# Patient Record
Sex: MALE | Race: WHITE | HISPANIC OR LATINO
[De-identification: names, ages, dates, MRNs, and addresses within clinical notes are randomized per-mention and may not be internally consistent; named-entity substitution may affect disease eponyms.]

---
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
## 2020-02-04 ENCOUNTER — EMERGENCY (EMERGENCY)
Age: 20
LOS: 1 days | Discharge: HOME | End: 2020-02-04
Attending: EMERGENCY MEDICINE | Admitting: EMERGENCY MEDICINE
Payer: MEDICAID

## 2020-02-04 VITALS
DIASTOLIC BLOOD PRESSURE: 80 MMHG | RESPIRATION RATE: 20 BRPM | TEMPERATURE: 99 F | WEIGHT: 164.46 LBS | HEART RATE: 103 BPM | OXYGEN SATURATION: 97 % | SYSTOLIC BLOOD PRESSURE: 128 MMHG

## 2020-02-04 VITALS — HEART RATE: 97 BPM

## 2020-02-04 PROCEDURE — 99284 EMERGENCY DEPT VISIT MOD MDM: CPT

## 2020-02-04 PROCEDURE — 71046 X-RAY EXAM CHEST 2 VIEWS: CPT | Mod: 26

## 2020-02-04 PROCEDURE — 93010 ELECTROCARDIOGRAM REPORT: CPT

## 2020-02-04 NOTE — ED PROVIDER NOTE - PATIENT PORTAL LINK FT
You can access the FollowMyHealth Patient Portal offered by [facility] by registering at the following website: [email or web address]. By joining [employer]’s FollowMyHealth portal, you will also be able to view your health information using other applications (apps) compatible with our system.

## 2020-02-04 NOTE — ED STATDOCS - OBJECTIVE STATEMENT
20 y/o M presents to ED c/o middle CP, and cough. Pt had a fever, and was vomiting yesterday. Pt denies taking any medication for CP.  Afebrile currently.  Pt denies any drugs, and smoking. Pt is sexually active, and uses protection.       PMH/PSH: negative  FH/SH: non-contributory, except as noted in the HPI  Allergies: No known drug allergies  Immunizations: Up-to-date  Medications: No chronic home medications 20 y/o M presents to ED c/o middle CP, and cough. Pt had a fever, and was vomiting yesterday. Pt denies taking any medication for CP.  Afebrile currently.  Pt denies any drugs, and smoking. Pt is sexually active, and uses protection.   PMH/PSH: negative  FH/SH: non-contributory, except as noted in the HPI  Allergies: No known drug allergies  Immunizations: Up-to-date  Medications: No chronic home medications  I performed a medical screening examination and determined this patient to be medically stable and will transfer to the [facility] adult ED for further care. heart and lung exam done and both did not reveal concerns for immediate intercvention.

## 2020-02-04 NOTE — ED PROVIDER NOTE - NSFOLLOWUPINSTRUCTIONS_ED_ALL_ED_FT
You were seen in the Emergency Department (ED) for chest pain. Your EKG and Chest xray were negative for acute life threatening injuries.      Please take over the counter Ibuprofen for your pain.    Please follow up with your primary care doctor in the next 72 hours.    Please return to the ED if you experience any new or concerning symptoms, such as: change in the characteristic of your chest pain, difficulty breathing, passing out, unable to eat or drink, unable to move or feel part of your body, fever, chills.     Thank you for visiting a [facility] ED.

## 2020-02-04 NOTE — ED PROVIDER NOTE - NS ED ROS FT
GENERAL: No fever, chills  EYES: no vision changes, no discharge.   HEENT: no difficulty swallowing or speaking   CARDIAC: + chest pain, no SOB, lower ex edema  PULMONARY: no cough, SOB  GI: no abdominal pain, n/v/d  : no dysuria  SKIN: no rashes  NEURO: no headache, lightheadedness, paresthesia  MSK: No joint pain, myalgia, weakness.

## 2020-02-04 NOTE — ED PROVIDER NOTE - CLINICAL SUMMARY MEDICAL DECISION MAKING FREE TEXT BOX
Young male no pmhx, pw shifting chest pressure morning after heavy vomiting. No sig findings on PE. Possible pneumomediastinum given history of vomiting and shifting chest pain. Less likely pericarditis or PE given normal EKG. CXR home.

## 2020-02-04 NOTE — ED PEDIATRIC TRIAGE NOTE - CHIEF COMPLAINT QUOTE
pt states "My lungs hurt when I breathe, had a headache and vomiting yesterday" pt alert, BCR, no PMH, IUTD, b/l lungs clear

## 2020-02-04 NOTE — ED PROVIDER NOTE - ATTENDING CONTRIBUTION TO CARE
Jessica: 20 yo male with no significant medical history c/o upper back/chest pain which began this morning after an episode fo forceful vomiting last night. Pt drank a double shot of espresso which he thinks upset his stomach and caused him to vomit. NO associated abdominal pain and since the vomiting pt has been tolerating PO. NO fevers, chills or flank pain. Chest pain worse when laying flat. No LE pain or edema. No recent travel or immobilization. Exam: GENERAL: well appearing, NAD, HEENT: MMM, no scleral icterus, CARDIO: +S1/S2, no murmurs, rubs or gallops, LUNGS: CTA B/L, no wheezing, rales or rhonchi, ABD: soft, nontender, BSx4 quadrants, no guarding or rigidity. EXT: No LE edema or calf TTP, 2+ distal pulses x 4 extremities. NEURO: AxOx3, SKIN: no rashes or lesions, well perfused A/P- 20 yo male with chest pain s/p vomiting. EKG unremarkable. will obtain CXR and repeat vitals.

## 2020-02-04 NOTE — ED ADULT TRIAGE NOTE - CHIEF COMPLAINT QUOTE
pt coming with SORTO since yest. + vomiting x 1, tightness of the chest, denies any ABd pain, no fever.

## 2020-02-04 NOTE — ED PROVIDER NOTE - OBJECTIVE STATEMENT
20 yo M no sig pmhx, pw chest pain. Pt endorse 10 minute episode of vomiting and retching last night. This AM, pt noticed shifting chest heaviness that is positional. Pt denies SOB, difficulty breathing, recent URI, fever and chills, sick contacts. EKG at triage normal sinus with no alternans. No cocaine use. No other complaints. no recent travel or immobilization.

## 2021-03-14 ENCOUNTER — EMERGENCY (EMERGENCY)
Facility: HOSPITAL | Age: 21
LOS: 1 days | Discharge: HOME | End: 2021-03-14
Attending: EMERGENCY MEDICINE | Admitting: EMERGENCY MEDICINE
Payer: MEDICAID

## 2021-03-14 VITALS
RESPIRATION RATE: 17 BRPM | OXYGEN SATURATION: 97 % | DIASTOLIC BLOOD PRESSURE: 60 MMHG | SYSTOLIC BLOOD PRESSURE: 121 MMHG | TEMPERATURE: 99 F | HEART RATE: 96 BPM

## 2021-03-14 VITALS
DIASTOLIC BLOOD PRESSURE: 63 MMHG | HEART RATE: 98 BPM | TEMPERATURE: 98 F | OXYGEN SATURATION: 100 % | SYSTOLIC BLOOD PRESSURE: 144 MMHG | RESPIRATION RATE: 20 BRPM

## 2021-03-14 LAB
ALBUMIN SERPL ELPH-MCNC: 4.5 G/DL — SIGNIFICANT CHANGE UP (ref 3.3–5)
ALP SERPL-CCNC: 107 U/L — SIGNIFICANT CHANGE UP (ref 40–120)
ALT FLD-CCNC: 35 U/L — SIGNIFICANT CHANGE UP (ref 4–41)
ANION GAP SERPL CALC-SCNC: 11 MMOL/L — SIGNIFICANT CHANGE UP (ref 7–14)
AST SERPL-CCNC: 28 U/L — SIGNIFICANT CHANGE UP (ref 4–40)
BASOPHILS # BLD AUTO: 0 K/UL — SIGNIFICANT CHANGE UP (ref 0–0.2)
BASOPHILS NFR BLD AUTO: 0 % — SIGNIFICANT CHANGE UP (ref 0–2)
BILIRUB SERPL-MCNC: 0.5 MG/DL — SIGNIFICANT CHANGE UP (ref 0.2–1.2)
BUN SERPL-MCNC: 8 MG/DL — SIGNIFICANT CHANGE UP (ref 7–23)
CALCIUM SERPL-MCNC: 9.5 MG/DL — SIGNIFICANT CHANGE UP (ref 8.4–10.5)
CHLORIDE SERPL-SCNC: 99 MMOL/L — SIGNIFICANT CHANGE UP (ref 98–107)
CO2 SERPL-SCNC: 25 MMOL/L — SIGNIFICANT CHANGE UP (ref 22–31)
CREAT SERPL-MCNC: 0.89 MG/DL — SIGNIFICANT CHANGE UP (ref 0.5–1.3)
EOSINOPHIL # BLD AUTO: 0 K/UL — SIGNIFICANT CHANGE UP (ref 0–0.5)
EOSINOPHIL NFR BLD AUTO: 0 % — SIGNIFICANT CHANGE UP (ref 0–6)
GLUCOSE SERPL-MCNC: 89 MG/DL — SIGNIFICANT CHANGE UP (ref 70–99)
HCT VFR BLD CALC: 47.3 % — SIGNIFICANT CHANGE UP (ref 39–50)
HETEROPH AB TITR SER AGGL: POSITIVE
HGB BLD-MCNC: 16.3 G/DL — SIGNIFICANT CHANGE UP (ref 13–17)
IANC: 2.89 K/UL — SIGNIFICANT CHANGE UP (ref 1.5–8.5)
LYMPHOCYTES # BLD AUTO: 0.94 K/UL — LOW (ref 1–3.3)
LYMPHOCYTES # BLD AUTO: 12.5 % — LOW (ref 13–44)
MCHC RBC-ENTMCNC: 31.8 PG — SIGNIFICANT CHANGE UP (ref 27–34)
MCHC RBC-ENTMCNC: 34.5 GM/DL — SIGNIFICANT CHANGE UP (ref 32–36)
MCV RBC AUTO: 92.4 FL — SIGNIFICANT CHANGE UP (ref 80–100)
MONOCYTES # BLD AUTO: 0.41 K/UL — SIGNIFICANT CHANGE UP (ref 0–0.9)
MONOCYTES NFR BLD AUTO: 5.4 % — SIGNIFICANT CHANGE UP (ref 2–14)
NEUTROPHILS # BLD AUTO: 4.42 K/UL — SIGNIFICANT CHANGE UP (ref 1.8–7.4)
NEUTROPHILS NFR BLD AUTO: 58.9 % — SIGNIFICANT CHANGE UP (ref 43–77)
PLATELET # BLD AUTO: 183 K/UL — SIGNIFICANT CHANGE UP (ref 150–400)
POTASSIUM SERPL-MCNC: 4 MMOL/L — SIGNIFICANT CHANGE UP (ref 3.5–5.3)
POTASSIUM SERPL-SCNC: 4 MMOL/L — SIGNIFICANT CHANGE UP (ref 3.5–5.3)
PROT SERPL-MCNC: 8.3 G/DL — SIGNIFICANT CHANGE UP (ref 6–8.3)
RBC # BLD: 5.12 M/UL — SIGNIFICANT CHANGE UP (ref 4.2–5.8)
RBC # FLD: 12.1 % — SIGNIFICANT CHANGE UP (ref 10.3–14.5)
SARS-COV-2 RNA SPEC QL NAA+PROBE: SIGNIFICANT CHANGE UP
SODIUM SERPL-SCNC: 135 MMOL/L — SIGNIFICANT CHANGE UP (ref 135–145)
WBC # BLD: 7.51 K/UL — SIGNIFICANT CHANGE UP (ref 3.8–10.5)
WBC # FLD AUTO: 7.51 K/UL — SIGNIFICANT CHANGE UP (ref 3.8–10.5)

## 2021-03-14 PROCEDURE — 71045 X-RAY EXAM CHEST 1 VIEW: CPT | Mod: 26

## 2021-03-14 PROCEDURE — 99284 EMERGENCY DEPT VISIT MOD MDM: CPT

## 2021-03-14 NOTE — ED PROVIDER NOTE - NSFOLLOWUPINSTRUCTIONS_ED_ALL_ED_FT
You were seen for fever.     You have infectious mononucleosis.     Avoid contact sports as this puts your at risk for spleen rupture and internal bleeding.     Please take 600 mg of Ibuprofen (aka Motrin, Advil) and/or 650 mg Acetaminophen (aka Tylenol) every 6 hours, as needed, for mild-moderate pain.      Seek immediate medical assistance for any new or worsening symptoms.

## 2021-03-14 NOTE — ED PROVIDER NOTE - OBJECTIVE STATEMENT
19 y/o M no PMHx presents to the ED c/o painful lymphadenopathy to right and left neck. Pt noted lymphadenopathy 5 days ago and last night became tender. Pt also started to experience fever and chills last night. Pt reports few days fatigue. Pt's girlfriend had mono in January. Denies sore throat, rash, n/v/d. Pt says some discomfort to LUQ abdomen. Denies COVID contact. Pt has taken no medication.

## 2021-03-14 NOTE — ED PROVIDER NOTE - CLINICAL SUMMARY MEDICAL DECISION MAKING FREE TEXT BOX
21 y/o healthy M with concern for mononucleosis with positive mono sick contact at home. However, vitals stable. Exam benign. Differential diagnosis mono and other mono like EPV, CMV. Plan to obtain mono tests, EPV, CMV test and COVID swab. Discharge home with supportive care and PCP follow up.

## 2021-03-14 NOTE — ED PROVIDER NOTE - ATTENDING CONTRIBUTION TO CARE
Attending Statement: I have personally seen and examined this patient. I have fully participated in the care of this patient. I have reviewed all pertinent clinical information, including history physical exam, plan and the Resident's note and agree except as noted  21yo M no sig pmhx pw painful lymphadenopathy x 3 days. Last week noticed swelling of bl neck, over the last three days endorse painful swelling. no stiff neck. no cp or sob. no fever/chills. no nausea/vomit or abdominal pain.  no change in voice/speaking or swallowing. no night sweats no weight loss. no travel. Girlfriend had mono in Jan 2021  Vital signs noted. sitting up nontoxic appearing. supple neck. mmm. bl preauricular tender lymph node right greater than left. no swelling of lips/tongue. normal S1-S2 No resp distress. able to speak in full and clear sentences. no wheeze, rales or stridor. no rash no jaundice. no vesicles. no pedal edema. no calf tenderness. normal pulses bilateral feet.  plan covid, labs, cxr, re asses

## 2021-03-14 NOTE — ED PROVIDER NOTE - PATIENT PORTAL LINK FT
You can access the FollowMyHealth Patient Portal offered by [facility] by registering at the following website: [email or web address]. By joining [employer]’s FollowMyHealth portal, you will also be able to view your health information using other applications (apps) compatible with our system.

## 2021-03-15 LAB
EBV EA AB SER IA-ACNC: <5 U/ML — SIGNIFICANT CHANGE UP
EBV EA AB TITR SER IF: NEGATIVE — SIGNIFICANT CHANGE UP
EBV EA IGG SER-ACNC: NEGATIVE — SIGNIFICANT CHANGE UP
EBV NA IGG SER IA-ACNC: <3 U/ML — SIGNIFICANT CHANGE UP
EBV PATRN SPEC IB-IMP: SIGNIFICANT CHANGE UP
EBV VCA IGG AVIDITY SER QL IA: NEGATIVE — SIGNIFICANT CHANGE UP
EBV VCA IGM SER IA-ACNC: 11.4 U/ML — SIGNIFICANT CHANGE UP
EBV VCA IGM SER IA-ACNC: >160 U/ML — HIGH
EBV VCA IGM TITR FLD: POSITIVE

## 2021-03-16 LAB — CMV DNA CSF QL NAA+PROBE: SIGNIFICANT CHANGE UP

## 2021-03-16 NOTE — ED POST DISCHARGE NOTE - REASON FOR FOLLOW-UP
Other Mono : positive. Patient conatced already aware of mono results and PMD also aware according to patient. Discussed with patient need to return to ED if symptoms don't continue to improve or recur or develops any new or worsening symptoms that are of concern.

## 2021-08-05 ENCOUNTER — NON-APPOINTMENT (OUTPATIENT)
Age: 21
End: 2021-08-05

## 2021-08-05 ENCOUNTER — APPOINTMENT (OUTPATIENT)
Dept: INTERNAL MEDICINE | Facility: CLINIC | Age: 21
End: 2021-08-05

## 2021-08-05 ENCOUNTER — OUTPATIENT (OUTPATIENT)
Dept: OUTPATIENT SERVICES | Facility: HOSPITAL | Age: 21
LOS: 1 days | End: 2021-08-05
Payer: SELF-PAY

## 2021-08-05 VITALS
HEART RATE: 80 BPM | WEIGHT: 154 LBS | SYSTOLIC BLOOD PRESSURE: 124 MMHG | DIASTOLIC BLOOD PRESSURE: 70 MMHG | OXYGEN SATURATION: 98 %

## 2021-08-05 DIAGNOSIS — I10 ESSENTIAL (PRIMARY) HYPERTENSION: ICD-10-CM

## 2021-08-05 PROCEDURE — G0463: CPT

## 2021-08-06 LAB
ANION GAP SERPL CALC-SCNC: 11 MMOL/L
BASOPHILS # BLD AUTO: 0.03 K/UL
BASOPHILS NFR BLD AUTO: 0.4 %
BUN SERPL-MCNC: 10 MG/DL
C TRACH RRNA SPEC QL NAA+PROBE: NOT DETECTED
CALCIUM SERPL-MCNC: 10 MG/DL
CHLORIDE SERPL-SCNC: 102 MMOL/L
CO2 SERPL-SCNC: 26 MMOL/L
CREAT SERPL-MCNC: 0.75 MG/DL
EOSINOPHIL # BLD AUTO: 0.4 K/UL
EOSINOPHIL NFR BLD AUTO: 6 %
GLUCOSE SERPL-MCNC: 107 MG/DL
HCT VFR BLD CALC: 48.8 %
HCV AB SER QL: NONREACTIVE
HCV S/CO RATIO: 0.12 S/CO
HGB BLD-MCNC: 16.7 G/DL
HIV1+2 AB SPEC QL IA.RAPID: NONREACTIVE
IMM GRANULOCYTES NFR BLD AUTO: 0.4 %
LYMPHOCYTES # BLD AUTO: 1.92 K/UL
LYMPHOCYTES NFR BLD AUTO: 28.7 %
MAN DIFF?: NORMAL
MCHC RBC-ENTMCNC: 32 PG
MCHC RBC-ENTMCNC: 34.2 GM/DL
MCV RBC AUTO: 93.5 FL
MONOCYTES # BLD AUTO: 0.62 K/UL
MONOCYTES NFR BLD AUTO: 9.3 %
N GONORRHOEA RRNA SPEC QL NAA+PROBE: NOT DETECTED
NEUTROPHILS # BLD AUTO: 3.69 K/UL
NEUTROPHILS NFR BLD AUTO: 55.2 %
PLATELET # BLD AUTO: 276 K/UL
POTASSIUM SERPL-SCNC: 4.4 MMOL/L
RBC # BLD: 5.22 M/UL
RBC # FLD: 12.7 %
SODIUM SERPL-SCNC: 139 MMOL/L
SOURCE AMPLIFICATION: NORMAL
T PALLIDUM AB SER QL IA: NEGATIVE
TSH SERPL-ACNC: 2.18 UIU/ML
WBC # FLD AUTO: 6.69 K/UL

## 2021-08-11 NOTE — PLAN
[FreeTextEntry1] : Mr Lakhwinder is a 20 y.o M with a PMH of insomnia who presents as a new patient to establish care with a chief complaint of inability to gain weight and insomnia\par \par ##weight\par -patient states that since Feburary/March 2021 has lost 20-30 pounds due to mononucleousis\par -has been unable to gain much of that weight back despite increased appetite\par -in the context of anxiety/insomnia will need to consider hyperthyroidism ?maybe thyroiditis s/p mononucleosis\par -TSH sent\par -denies symptoms of depression and negative PHQ\par \par ##insomnia\par -TREVOR of 3, less likely anxiety disorder\par -now provoked due to anticipating school re-starting\par -referral to  given\par \par ##HCM\par -CBC/BMP\par -sent hep C/HIV/GC chylamidia since sexually active\par \par RTC 1 year or prn\par \par case d/w Dr. Quiroz\par \par Ari Yun MD, PGY-3\par Internal Medicine\par [id number]

## 2021-08-11 NOTE — HEALTH RISK ASSESSMENT
[Good] : ~his/her~  mood as  good [1 or 2 (0 pts)] : 1 or 2 (0 points) [Never (0 pts)] : Never (0 points) [No] : In the past 12 months have you used drugs other than those required for medical reasons? No [0] : 2) Feeling down, depressed, or hopeless: Not at all (0) [PHQ-2 Negative - No further assessment needed] : PHQ-2 Negative - No further assessment needed [With Family] : lives with family [Employed] : employed [Significant Other] : lives with significant other [Sexually Active] : sexually active [] : No [de-identified] : no specialists [Audit-CScore] : 0 [de-identified] : has stopped exercising due to work [de-identified] : breakfast: bagel, oats/cereal. ? lactose intolerant. lunch/dinner: pizza/pasta [[id number]] : 0 [FreeTextEntry2] : [employer]. studying mechanical engineering [de-identified] : uses protection highly consistently

## 2021-08-11 NOTE — HISTORY OF PRESENT ILLNESS
[FreeTextEntry1] : new patient [de-identified] : Mr Keane is a 20 y.o M with no PMH here to establish care\par \par Has noticed for 1-2 months has increased appetite and started to lose weight\par Lost 20-30 pounds after syd mono in February/March this year. Lost weight 20-30 pounds. \par Normal weight is appx. 180 lbs. \par Patient endorses increased perspiration in the past, however, has been sweating less\par Patient endorses increased anxiety related to school. TREVOR in office of 3\par Slept on 30 minutes the night prior to visit\par No known thyroid disease or family history \par Patient has had persistenly low weight despite recovery from illness. \par \par Patient also notes consistent pruritis. Exacerbated by stress. Rash/puritis on eyelids in addition to inguinal area/scrotum. Has tried aquafor for pruritis. \par \par Surgeries: none\par Hospitalizations: none\par I: COVID vaccine. Moderna x2 March/April 2021\par T: no tests/imaging\par C: no childhood illnesses\par A: allergic to latex (itchiness). seasonal allergies\par M: no meds. zyrtec/allegra prn

## 2021-08-13 DIAGNOSIS — F51.02 ADJUSTMENT INSOMNIA: ICD-10-CM

## 2021-08-13 DIAGNOSIS — R21 RASH AND OTHER NONSPECIFIC SKIN ERUPTION: ICD-10-CM

## 2021-08-26 ENCOUNTER — OUTPATIENT (OUTPATIENT)
Dept: OUTPATIENT SERVICES | Facility: HOSPITAL | Age: 21
LOS: 1 days | End: 2021-08-26
Payer: SELF-PAY

## 2021-08-26 ENCOUNTER — APPOINTMENT (OUTPATIENT)
Dept: DERMATOLOGY | Facility: HOSPITAL | Age: 21
End: 2021-08-26
Payer: COMMERCIAL

## 2021-08-26 VITALS
TEMPERATURE: 96.7 F | WEIGHT: 162 LBS | HEIGHT: 70 IN | SYSTOLIC BLOOD PRESSURE: 126 MMHG | BODY MASS INDEX: 23.19 KG/M2 | DIASTOLIC BLOOD PRESSURE: 76 MMHG | HEART RATE: 79 BPM

## 2021-08-26 DIAGNOSIS — L98.9 DISORDER OF THE SKIN AND SUBCUTANEOUS TISSUE, UNSPECIFIED: ICD-10-CM

## 2021-08-26 PROCEDURE — G0463: CPT

## 2021-08-26 PROCEDURE — 99204 OFFICE O/P NEW MOD 45 MIN: CPT | Mod: GC

## 2021-08-27 DIAGNOSIS — L30.1 DYSHIDROSIS [POMPHOLYX]: ICD-10-CM

## 2021-08-27 DIAGNOSIS — L30.9 DERMATITIS, UNSPECIFIED: ICD-10-CM

## 2021-11-11 ENCOUNTER — APPOINTMENT (OUTPATIENT)
Dept: DERMATOLOGY | Facility: HOSPITAL | Age: 21
End: 2021-11-11

## 2022-01-07 ENCOUNTER — EMERGENCY (EMERGENCY)
Facility: HOSPITAL | Age: 22
LOS: 1 days | Discharge: HOME | End: 2022-01-07
Admitting: EMERGENCY MEDICINE
Payer: MEDICAID

## 2022-01-07 VITALS
HEART RATE: 107 BPM | TEMPERATURE: 98 F | SYSTOLIC BLOOD PRESSURE: 156 MMHG | DIASTOLIC BLOOD PRESSURE: 94 MMHG | RESPIRATION RATE: 18 BRPM | OXYGEN SATURATION: 97 %

## 2022-01-07 VITALS
SYSTOLIC BLOOD PRESSURE: 137 MMHG | DIASTOLIC BLOOD PRESSURE: 84 MMHG | RESPIRATION RATE: 17 BRPM | OXYGEN SATURATION: 100 % | HEART RATE: 95 BPM

## 2022-01-07 PROCEDURE — 71045 X-RAY EXAM CHEST 1 VIEW: CPT | Mod: 26

## 2022-01-07 PROCEDURE — 99284 EMERGENCY DEPT VISIT MOD MDM: CPT

## 2022-01-07 NOTE — ED PROVIDER NOTE - NSFOLLOWUPINSTRUCTIONS_ED_ALL_ED_FT
COVID-19 (Coronavirus Disease 2019)    WHAT YOU NEED TO KNOW:    What do I need to know about coronavirus disease 2019 (COVID-19)? COVID-19 is the disease caused by the novel (new) coronavirus first discovered in December 2019. Coronaviruses generally cause upper respiratory (nose, throat, and lung) infections, such as a cold. The new virus can also cause serious lower respiratory conditions, such as pneumonia or acute respiratory distress syndrome (ARDS). Anyone can develop serious problems from the new virus, but your risk is higher if you are 65 or older. A weak immune system, diabetes, or a heart or lung condition can also increase your risk.    What are the signs and symptoms of COVID-19? You may not develop any signs or symptoms. Signs and symptoms that do develop usually start about 5 days after infection but can take 2 to 14 days. Signs and symptoms range from mild to severe. You may feel like you have the flu or a bad cold. Information on COVID-19 is still being learned. Tell your healthcare provider if you think you were infected but develop signs or symptoms not listed below:  •A cough  •Shortness of breath or trouble breathing that may become severe  •A fever of at least 100.4°F, or 38°C (may be lower in adults 65 or older)  •Chills that might include shaking  •Muscle pain, body aches, or a headache  •A sore throat  •Suddenly not being able to taste or smell anything  •Feeling mentally and physically tired (fatigue)  •Congestion (stuffy head and nose), or a runny nose.  •Diarrhea, nausea, or vomiting    How is COVID-19 diagnosed? If you think you have COVID-19, call your healthcare provider. In some areas, testing is only done if a person has severe symptoms or is hospitalized. Testing is done more widely in other places. Your provider will tell you what to do based on your symptoms and the rules in your area. In general, the following may be used:   •A viral test shows if you have a current infection. Samples are taken from your nose and throat, usually with swabs. You may need to wait several days to get the test results. Your healthcare provider will tell you how to get your results. You will need to quarantine (stay physically away from others) until you get your results. If results show you have COVID-19, you will need to quarantine until you are well. Your provider or other health official may give you more directions. You will also need to prevent another infection until it is known if you can get COVID-19 again.  •An antibody test shows if you had a past infection. Blood samples are used for this test. Antibodies are made by your immune system to attack the virus that causes COVID-19. Antibodies will form 1 to 3 weeks after you are infected. It is not known if antibodies prevent a second infection, or for how long a person might be protected. If you have antibodies, you will still need to be careful around others until more is known.  •CT scans or x-rays may be used to check for signs of pneumonia. The 2019 coronavirus causes a specific kind of pneumonia, usually in both lungs.      How is COVID-19 treated? No medicine or specific treatment is currently approved for COVID-19. The following may be used to manage your symptoms or treat the effects of COVID-19:   •Mild symptoms may get better on their own. If you do not need to be treated in a hospital, you will be given instructions to use at home. Your condition will be closely monitored. You will need to watch for worsening symptoms and seek immediate care if needed. Talk to your healthcare provider about the following:?Relieve your symptoms. To soothe a sore throat, gargle with warm salt water, or use throat lozenges or a throat spray. Your healthcare provider may recommend a cough medicine. Drink more liquids to thin and loosen mucus and to prevent dehydration. Use decongestants or saline drops as directed for nasal congestion.  ?NSAIDs or acetaminophen can help lower a fever and relieve body aches or a headache. Follow directions. If not taken correctly, NSAIDs can cause kidney damage and acetaminophen can cause liver damage.    •Severe or life-threatening symptoms are treated in the hospital. You may need a combination of the following:?Medicines may be given to reduce inflammation or to fight the virus. You may also need blood thinners to prevent or treat blood clots. If you have a deep vein thrombosis (DVT) or pulmonary embolism (PE), you may need to keep using blood thinners for 3 months.  ?Extra oxygen may be given if you have respiratory failure. This means your lungs cannot get enough oxygen into your blood and out to your organs. Extra oxygen can help prevent organ failure.  ?A ventilator may be used to help you breathe.  ?Convalescent plasma (part of blood) from a patient who has recovered from COVID-19 may be used. The plasma contains antibodies that can help your body fight the infection. Convalescent plasma is only given to patients who have severe signs and symptoms.        How does the 2019 coronavirus spread? The virus spreads quickly and easily. You can become infected if you are in contact with a large amount of the virus, even for a short time. You can also become infected by being around a small amount of virus for a long time. The following are ways the virus is thought to spread, but more information may be coming:   •Droplets are the most common way all coronaviruses spread. The virus can travel in droplets that form when a person talks, coughs, or sneezes. Anyone who breathes in the droplets or gets them in his or her eyes can become infected with the virus. Close personal contact with an infected person is thought to be the main way the virus spreads. Close personal contact means you are within 6 feet (2 meters) of the person.  •Person-to-person contact can spread the virus. For example, a person with the virus on his or her hands can spread it by shaking hands with someone. At this time, it does not appear that the virus can be passed to a baby during pregnancy or delivery. The baby can be infected after he or she is born through person-to-person contact. The virus also does not appear to spread in breast milk. If you are pregnant or breastfeeding, talk to your healthcare provider or obstetrician about any concerns you have.  •The virus can stay on objects and surfaces. A person can get the virus on his or her hands by touching the object or surface. Infection happens if the person then touches his or her eyes or mouth with unwashed hands. It is not yet known how long the virus can stay on an object or surface. That is why it is important to clean all surfaces that are used regularly.  •An infected animal may be able to infect a person who touches it. This may happen at live markets or on a farm.      How can everyone lower the risk for COVID-19? The best way to prevent infection is to avoid anyone who is infected, but this can be hard to do. An infected person can spread the virus before signs or symptoms begin, or even if signs or symptoms never develop. The following can help lower the risk for infection:   Limit the Spread of Infectious Disease    •Wash your hands often throughout the day. Use soap and water. Rub your soapy hands together, lacing your fingers. Wash the front and back of each hand, and in between your fingers. Use the fingers of one hand to scrub under the fingernails of the other hand. Wash for at least 20 seconds. Rinse with warm, running water for several seconds. Then dry your hands with a clean towel or paper towel. Use hand  that contains alcohol if soap and water are not available. Do not touch your eyes, nose, or mouth without washing your hands first. Teach children how to wash their hands and use hand .  Handwashing  •Cover a sneeze or cough. This prevents droplets from traveling from you to others. Turn your face away and cover your mouth and nose with a tissue. Throw the tissue away. Use the bend of your arm if a tissue is not available. Then wash your hands well with soap and water or use hand . Turn and cover your face if you are around someone who is sneezing or coughing. Teach children how to cover a cough or sneeze.  •Follow worldwide, national, and local social distancing guidelines. Social distancing means people avoid close physical contact so the virus cannot spread from one person to another. Keep at least 6 feet (2 meters) between you and others. Also keep this distance from anyone who comes to your home, such as someone making a delivery.  •Make a habit of not touching your face. It is not known how long the virus can stay on objects and surfaces. If you get the virus on your hands, you can transfer it to your eyes, nose, or mouth and become infected. You can also transfer it to objects, surfaces, or people. Be aware of what you touch when you go out. Examples include handrails and elevator buttons. Try not to touch anything with bare hands unless it is necessary. Wash your hands before you leave your home and when you return.  •Clean and disinfect high-touch surfaces and objects often. Use a disinfecting solution or wipes. You can make a solution by diluting 4 teaspoons of bleach in 1 quart (4 cups) of water. Clean and disinfect even if you think no one living in or coming to your home is infected with the virus. You can wipe items with a disinfecting cloth before you bring them into your home. Wash your hands after you handle anything you bring into your home.  •Make your immune system as healthy as possible. A weakened immune system makes you more vulnerable to the new coronavirus. No COVID-19 vaccine is available yet. Vaccines such as the flu and pneumonia vaccines can help your immune system. Your healthcare provider can tell you which vaccines to get, and when to get them. Keep your immune system as strong as possible. Do not smoke. Eat healthy foods, exercise regularly, and try to manage stress. Go to bed and wake up at the same times each day.        How do I follow social distancing guidelines to help lower the risk for COVID-19? National and local social distancing rules vary. Rules may change over time as restrictions are lifted. Restrictions may return if an outbreak happens where you live. It is important to know and follow all current social distancing rules in your area. The following are general guidelines:  •Limit trips out of your home. You may be able to have food, medicines, and other supplies delivered. If possible, have delivered items left at your door or other area. Try not to have someone hand you an item. You will be so close to the person that the virus can spread between you.  •Do not have close physical contact with anyone who does not live in your home. Do not shake hands with, hug, or kiss a person as a greeting. Stand or walk as far from others as possible. If you must use public transportation (such as a bus or subway), try to sit or stand away from others. You can stay safely connected with others through phone calls, e-mail messages, social media websites, and video chats. Check in on anyone who may be having a hard time socially distancing, or who lives alone. Ask administrators at nursing homes or long-term care facilities how you can safely communicate with someone living there.  •Wear a cloth face covering around others who do not live in your home. Face coverings help prevent the virus from spreading to others in droplets. You can use a clear face covering if someone needs to read your lips. This is a cloth covering that has plastic over the mouth area so your lips can be seen. Do not use coverings that have breathing valves or vents. The virus can travel out of the valve or vent and be spread to others. Do not take your covering off to talk, cough, or sneeze. Do not use coverings on children younger than 2 years or on anyone who has breathing problems or cannot remove it.  •Only allow medical or other necessary professionals into your home. Wear your face covering, and remind professionals to wear a face covering. Remind them to wash their hands when they arrive and before they leave. Do not let anyone who does not live in your home in, even if the person is not sick. A person can pass the virus to others before symptoms of COVID-19 begin. Some people never even develop symptoms. Children commonly have mild symptoms or no symptoms. It may be hard to tell a child not to hug or kiss you. Explain that this is how he or she can help you stay healthy.  •Do not go to someone else's home unless it is necessary. Do not go over to visit, even if the person is lonely. Only go if you need to help him or her. Make sure you both wear face coverings while you are there.  •Avoid large gatherings and crowds. Gatherings or crowds of 10 or more individuals can cause the virus to spread. Examples of gatherings include parties, sporting events, Gnosticist services, and conferences. Crowds may form at beaches, combs, and tourist attractions. Protect yourself by staying away from large gatherings and crowds.  •Ask your healthcare provider for other ways to have appointments. You may be able to have appointments without having to go into the provider's office. Some providers offer phone, video, or other types of appointments. You may also be able to get prescriptions for a few months of your medicines at a time.  •Stay safe if you must go out to work. You may have a job that can only be done outside your home. Keep physical distance between you and other workers as much as possible. Follow your employer's rules so everyone stays safe.      What should I do if I have COVID-19 and am recovering at home? Healthcare providers will give you specific instructions to follow. The following are general guidelines to remind you how to keep others safe until you are well:   •Wash your hands often. Use soap and water as much as possible. You can use hand  that contains alcohol if soap and water are not available. Do not share towels with anyone. If you use paper towels, throw them away in a lined trash can kept in your room or area. Use a covered trash can, if possible.  •Do not go out of your home unless it is necessary. You may have to go to your healthcare provider's office for check-ups or to get prescription refills. Do not arrive at the provider's office without an appointment. Providers have to make their offices safe for staff and other patients.  •Do not have close physical contact with anyone unless it is necessary. Only have close physical contact with a person giving direct care, or a baby or child you must care for. Family members and friends should not visit you. If possible, stay in a separate area or room of your home if you live with others. No one should go into the area or room except to give you care. You can visit with others by phone, video chat, e-mail, or similar systems. It is important to stay connected with others in your life while you recover.  •Wear a face covering while others are near you. This can help prevent droplets from spreading the virus when you talk, sneeze, or cough. Put the covering on before anyone comes into your room or area. Remind the person to cover his or her nose and mouth before going in to provide care for you.  •Do not share items. Do not share dishes, towels, or other items with anyone. Items need to be washed after you use them.  •Protect your baby. Wash your hands with soap and water often throughout the day. Wear a clean face covering while you breastfeed, or while you express or pump breast milk. If possible, ask someone who is well to care for your baby. You can put breast milk in bottles for the person to use, if needed. Talk to your healthcare provider if you have any questions or concerns about caring for or bonding with your baby. He or she will tell you when to bring your baby in for check-ups and vaccines. He or she will also tell you what to do if you think your baby was infected with the new virus.  •Do not handle live animals. Until more is known, it is best not to touch, play with, or handle live animals. Some animals, including pets, have been infected with the new coronavirus. Do not handle or care for animals until you are well. Care includes feeding, petting, and cuddling your pet. Do not let your pet lick you or share your food. Ask someone who is not infected to take care of your pet, if possible. If you must care for a pet, wear a face covering. Wash your hands before and after you give care.  •Follow directions from your healthcare provider for being around others after you recover. You will need to wait at least 10 days after symptoms first appeared. Then you will need to have no fever for 24 hours without fever medicine, and no other symptoms. A loss of taste or smell may continue for several months. It is considered okay to be around others if this is your only symptom. It is not known for sure if or for how long a recovered person can pass the virus to others. Your provider may give you instructions, such as continuing social distancing or wearing a face covering around others.  How should I take care of someone who has COVID-19? If the person lives in another home, arrange for a time to give care. Remember to bring a few pairs of disposable gloves and a cloth face covering. The following are general guidelines to help you safely care for anyone who has COVID-19:  •Wash your hands often. Wash before and after you go into the person's home, area, or room. Throw paper towels away in a lined trash can that has a lid, if possible.  •Do not allow others to go near the person. No one should come into the person's home unless it is necessary. If possible, the person should be in a separate area or room if he or she lives with others. Keep the room's door shut unless you need to go in or out. Have others call, video chat, or e-mail the person if he or she is feeling well enough. The person may feel lonely if he or she is kept separate for a long period of time. Safe communication can help him or her stay connected to family and friends.  •Make sure the person's room has good air flow. You may be able to open the window if the weather allows. An air conditioner can also be turned on to help air move.  •Contact the person before you go in to give care. Make sure the person is wearing a face covering. Remind him or her to wash his or her hands with soap and water. He or she can use hand  that contains alcohol if soap and water are not available. Put on a face covering before you go in to give care.  •Wear gloves while you give care and clean. Clean items the person uses often. Clean countertops, cooking surfaces, and the fronts and insides of the microwave and refrigerator. Clean the shower, toilet, the area around the toilet, the sink, the area around the sink, and faucets. Gather used laundry or bedding. Wash and dry items on the warmest settings the fabric allows. Wash dishes and silverware in hot, soapy water or in a .  •Anything you throw away needs to go into a lined trash can. When you need to empty the trash, close the open end of the lining and tie it closed. This helps prevent items the virus is on from spilling out of the trash. Remove your gloves and throw them away. Wash your hands.      Where can I find more information?   •[facility] [employer] [facility] Prevention  [address] [place] [address]  Phone: 1-324.465.6399  Web Address: http://www.cdc.gov    What should I do if I think I or someone I know may be infected? Do the following to protect others:   •If emergency care is needed, tell the  about the possible infection, or call ahead and tell the emergency department.  •Call a healthcare provider for instructions if symptoms are mild. Anyone who may be infected should not arrive without calling first. The provider will need to protect staff members and other patients.  •The person who may be infected needs to wear a face covering while getting medical care. This will help lower the risk of infecting others. Coverings are not used for anyone who is younger than 2 years, has breathing problems, or cannot remove it. The provider can give you instructions for anyone who cannot wear a covering.      Call your local emergency number (911 in the ) or an emergency department if:   •You have trouble breathing or shortness of breath at rest.  •You have chest pain or pressure that lasts longer than 5 minutes.  •You become confused or hard to wake.  •Your lips or face are blue.  •You have a fever of 104°F (40°C) or higher.  When should I call my doctor?   •You do not have symptoms of COVID-19 but had close physical contact within 14 days with someone who tested positive.  •You have questions or concerns about your condition or care.      CARE AGREEMENT:  You have the right to help plan your care. Learn about your health condition and how it may be treated. Discuss treatment options with your healthcare providers to decide what care you want to receive. You always have the right to refuse treatment.       COVID-19 (Enfermedad por coronavirus 2019)  LO QUE NECESITA SABER:  ¿Qué necesito saber acerca de la enfermedad por coronavirus 2019 (COVID-19)?COVID-19 es la enfermedad causada por el nuevo coronavirus descubierto por primera vez en diciembre de 2019. Los coronavirus generalmente causan infecciones de las vías respiratorias superiores (nariz, garganta y pulmones), roly un resfriado. El nuevo virus también puede causar afecciones respiratorias inferiores graves, roly la neumonía o el síndrome de dificultad respiratoria aguda (SDRA). Cualquier persona puede desarrollar problemas graves a causa del nuevo virus, leydi el riesgo es mayor si tiene 65 años o más. Un sistema inmunitario débil, la diabetes o jenna enfermedad cardíaca o pulmonar también pueden aumentar el riesgo.    ¿Cuáles son los signos y síntomas de la COVID-19?Es posible que no presente ningún signo o síntoma. Los signos y síntomas que se presentan suelen empezar unos 5 días después de la infección leydi pueden tardar de 2 a 14 días. Los signos y síntomas pueden variar de leves a severos. Puede sentir roly si tuviera gripe o un resfriado gurmeet. La información sobre COVID-19 todavía se está aprendiendo. Dígale a loja médico si nikita que se ha infectado leydi desarrolla signos o síntomas que no se enumeran a continuación:  •Tos  •Falta de aliento o dificultad para respirar que puede llegar a ser grave  •Jenna fiebre de, al menos, 100.4 °F, o 38 °C (puede ser más baja en los adultos de 65 años o más)  •Escalofríos que pueden incluir temblores  •Dolor muscular, michelle corporales o dolor de leobardo  •El dolor de garganta  •De repente, no ser capaz de probar u oler nada  •Sensación de cansancio físico y mental (fatiga)  •Congestión (de la nariz y la leobardo) o flujo nasal  •Diarrea, náuseas o vómitos  ¿Cómo se diagnostica la COVID-19?Llame a loja médico si piensa que puede tener COVID-19. En algunas zonas, solo se realizan pruebas si jenna persona tiene síntomas graves o es hospitalizada. Las pruebas se hacen más ampliamente en otros lugares. Loja médico le dirá lo que debe hacer basándose en becki síntomas y en las normas de loja kay. En general, se puede utilizar lo siguiente:   •Un examen viralmuestra si tiene jenna infección actualmente. Se erica muestras de la nariz y la garganta, usualmente con hisopos. Es posible que tenga que esperar varios días para obtener los resultados de la prueba. Loja médico le dirá cómo obtener los resultados. Tendrá que ponerse en cuarentena (mantenerse físicamente alejado de los demás) hasta que obtenga los resultados. Si los resultados muestran que tiene COVID-19, tendrá que ponerse en cuarentena hasta que esté alexander. Loja médico u otro oficial de john pueden darle más instrucciones. También tendrá que prevenir otra infección hasta que se sepa si puede contraer COVID-19 de nuevo.  •Jenna prueba de anticuerposmuestra si tuvo jenna infección en el pasado. Para esta prueba se utilizan muestras de keshia. Los anticuerpos son producidos por el sistema inmunitario para atacar el virus que causa la COVID-19. Los anticuerpos se formarán de 1 a 3 semanas después de que se contagie. No se sabe si los anticuerpos previenen jenna segunda infección, o por cuánto tiempo jenna persona podría estar protegida. Si tiene anticuerpos, tendrá que tener cuidado con los demás hasta que se sepa más.  •Tomografías o radiografíaspodrían realizarse para comprobar si existen signos de neumonía. El coronavirus 2019 causa un tipo específico de neumonía, generalmente en ambos pulmones.    ¿Cómo se trata la COVID-19?Ningún medicamento o tratamiento específico está actualmente aprobado para la COVID-19. Lo siguiente puede utilizarse para controlar los síntomas o tratar los efectos de la COVID-19:   •Los síntomas levespodrían mejorar por sí solos. Si no necesita ser tratado en un hospital, se le darán instrucciones para que siga en loja casa. Controlarán atentamente loja estado. Deberá estar atento al empeoramiento de los síntomas y buscar atención inmediata si es necesario. Hable con loja médico acerca de lo siguiente:?Aliviar los síntomas.Para aliviar el dolor de garganta, rex gárgaras con agua salada tibia, o use pastillas para la garganta o un aerosol para la garganta. Loja médico puede recomendarle un medicamento para la tos. Amanda más líquidos para disolver y aflojar la mucosidad y para prevenir la deshidratación. Use descongestionantes o gotas de solución salina roly se indica para la congestión nasal.  ?Los BAHMAN o el acetaminofenopueden ayudar a bajar la fiebre y aliviar los michelle corporales o el dolor de leobardo. Siga las indicaciones. Si no se erica correctamente, los BAHMAN pueden causar sangrado estomacal o daño renal y el acetaminofeno puede dañar hepático.  •Los síntomas severos o potencialmente mortalesse tratan en el hospital. Es posible que usted necesite jenna combinación de los siguientes:?Los medicamentospueden administrarse para reducen la inflamación o combatir el virus. También podría necesitar anticoagulantes para prevenir o tratar los coágulos de keshia. Si tiene trombosis venosa profunda (TVP) o embolia pulmonar (PE), guerrero vez necesite seguir usando anticoagulantes fouzia 3 meses.  ?El oxígeno adicionalpodría administrarse si tiene insuficiencia respiratoria. [place] significa que los pulmones no pueden llevar suficiente oxígeno a la keshia y a los órganos. El oxígeno extra puede ayudar a prevenir la insuficiencia orgánica.  ?Un respiradorpodría usarse para ayudarlo a respirar.  ?El plasma (parte de la keshia) de convalecientede un paciente que se ha recuperado de la COVID-19 puede utilizarse. El plasma contiene anticuerpos que pueden ayudar a loja cuerpo a combatir la infección. El plasma de convaleciente solo se administra a pacientes que tienen signos y síntomas severos.  ¿Cómo se propaga el coronavirus 2019?El virus se propaga rápida y fácilmente. Puede infectarse si está en contacto con jenna gran cantidad del virus, incluso fouzia poco tiempo. También puede infectarse por estar cerca de jenna pequeña cantidad del virus fouzia mucho tiempo. A continuación se indican las formas en que se nikita que se propaga el virus, leydi es posible que surja más información:   •Las gotitas son la forma más común de propagación de todos los coronavirus.El virus puede viajar en gotitas que se maru cuando jenna persona habla, tose o estornuda. Cualquiera que respire las gotitas o que las gotitas se le metan en los ojos puede infectarse con el virus. Se nikita que el contacto personal cercano con jenna persona infectada es la principal forma de propagación del virus. El contacto personal cercano significa estar a menos de 6 pies (2 metros) de otra persona.  •El contacto de persona a persona puede propagar el virus.Por ejemplo, jenna persona con el virus en becki dutch puede propagarlo al darle la mano a alguien. En thania momento, no parece que el virus pueda transmitirse a un bebé fouzia el embarazo o el parto. El bebé puede infectarse después de nacer por contacto de persona a persona. El virus tampoco parece propagarse por la leche materna. Si está embarazada o amamantando, hable con loja médico u obstetra sobre cualquier preocupación que tenga.  •El virus puede permanecer en objetos y superficies.Jenna persona puede contraer el virus en becki dutch al tocar el objeto o la superficie. La infección se produce si la persona se toca los ojos o la boca sin antes lavarse las dutch. Aún no se sabe cuánto tiempo puede permanecer el virus en un objeto o superficie. Por eso es importante limpiar todas las superficies que se usan regularmente.  •Un animal infectado puede ser capaz de infectar a jenna persona que lo toque.[place] puede ocurrir en mercados vivos o en jenna judit.  ¿Cómo puede todo el melida reducir el riesgo de COVID-19?La mejor manera de prevenir la infección es evitar a cualquiera que esté infectado, leydi esto puede ser difícil de lograr. Jenna persona infectada puede propagar el virus antes de que aparezcan los signos o síntomas, o incluso si los signos o síntomas nunca se desarrollan. Lo siguiente puede ayudar a reducir el riesgo de infección:   Limite la propagación de las enfermedades infecciosas  •Lávese las dutch con frecuencia fouzia el día.Utilice agua y jabón. Frótese las dutch enjabonadas, entrelazando los dedos. Lávese el frente y el dorso de cada mano, y entre los dedos. Use los dedos de jenna mano para restregar debajo de las uñas de la otra mano. Lávese fouzia al menos 20 segundos. Enjuague con agua corriente caliente fouzia varios segundos. Luego séquese las dutch con jenna toalla limpia o jenna toalla de papel. Puede usar un desinfectante para dutch que contenga alcohol, si no hay agua y jabón disponibles. No se toque los ojos, la nariz o la boca sin antes lavarse las dutch. Enseñe a los niños a lavarse las dutch y a usar el desinfectante de dutch.  Lavado de dutch  •Cúbrase al toser o estornudar.[place] preeti que las gotitas viajen de usted a los demás. Gire la dino y cúbrase la boca y la nariz con un pañuelo. Deseche el pañuelo. Use el ángulo del brazo si no tiene un pañuelo disponible. Luego lávese las dutch con agua y jabón o use un desinfectante de dutch. Gire la leobardo y cúbrase si está cerca de alguien que está estornudando o tosiendo. Enséñeles a los niños a cubrirse al toser o estornudar.  •Siga las pautas de distanciamiento social a nivel local, nacional y mundial.El distanciamiento social significa que las personas evitan el contacto físico cercano para que el virus no se propague de jenna persona a otra. Mantenga al menos 6 pies (2 metros) de distancia entre usted y los demás. También mantenga esta distancia de cualquiera que venga a loja casa, roly alguien que rex jenna entrega.  •Acostúmbrese a no tocarse la dino.No se sabe cuánto tiempo puede permanecer el virus en los objetos y las superficies. Si tiene el virus en las dutch, puede transferirlo a los ojos, la nariz o la boca e infectarse. También puede transferirlo a los objetos, las superficies o las personas. Tenga cuidado con lo que toca cuando sale. Por ejemplo, los pasamanos y botones de ascensor. Intente no tocar nada con las dutch descubiertas a menos que sea necesario. Lávese las dutch antes de salir de loja casa y cuando regresa.  •Limpie y desinfecte a menudo los objetos y las superficies de alto contacto.Use jenna solución o toallitas desinfectantes. Puede hacer jenna solución diluyendo 4 cucharaditas de lejía en 1 cuarto de galón (4 tazas) de agua. Limpie y desinfecte aunque piense que nadie que viva o haya entrado en loja casa esté infectado con el virus. Puede limpiar los objetos con un paño desinfectante antes de llevarlos a loja casa. Lávese las dutch después de manipular cualquier cosa que traiga a loja casa.  •Rex que loja sistema inmunitario esté lo más saludable posible.Un sistema inmunitario debilitado lo hace más vulnerable al nuevo coronavirus. No hay ninguna vacuna contra la COVID-19 disponible todavía. Las vacunas, roly la vacuna contra la gripe y la neumonía, pueden ayudar al sistema inmunitario. Loja médico le indicará qué vacunas debe recibir y cuándo aplicárselas. Mantenga loja sistema inmunitario lo más gurmeet posible. No fume. Consuma alimentos saludables, rex ejercicio regularmente e intente controlar el estrés. Acuéstese y levántese a la misma hora todos los días.   Alimentos saludables  ¿Cómo sigo las pautas de distanciamiento social para ayudar a reducir el riesgo de COVID-19?Las normas de distanciamiento social nacionales y locales varían. Las reglas pueden cambiar con el tiempo a medida que se levantan las restricciones. Las restricciones pueden volver a aplicarse si se produce un brote en el lugar donde usted vive. Es importante conocer y seguir todas las reglas de distanciamiento social actuales en loja área. Las siguientes son reglas generales al respecto:  •Limite los viajes fuera de loja casa.Es posible que se le entreguen alimentos, medicinas y otros suministros. Si es posible, rex que dejen los objetos que le entregan en loja fidel o en otra área. Intente que nadie le entregue un objeto en mano. Estará tan cerca de la persona que el virus puede propagarse entre ustedes.  •No tenga contacto físico cercano con nadie que no viva en loja casa.No le dé la mano, abrace o bese a jenna persona roly saludo. Párese o camine lo más lejos posible de los demás. Si tiene que usar el transporte público (roly el autobús o el metro), intente sentarse o pararse lejos de los demás. Puede mantenerse conectado de forma burks con los demás a través de llamadas telefónicas, mensajes de correo electrónico, sitios web de medios sociales y videochats. Verifique cómo están las personas que pueden tener dificultades para distanciarse socialmente, o que viven solas. Pregunte a los administradores de los asilos de ancianos o de las instalaciones de cuidados a viry plazo cómo puede comunicarse con seguridad con alguien que vive allí.  •Use un tapabocas de jignesh cuando esté cerca de otras personas que no viven en loja casa.Los tapabocas ayudan evitar que el virus se propague a otras personas en las gotitas. Puede usar un tapabocas transparente si alguien necesita leer becki labios. [place] es un tapabocas con un plástico sobre el área de la boca para que se puedan bolivar los labios. No utilice tapabocas que tengan válvulas de respiración o respiraderos. El virus puede salir por la válvula o el respiradero y contagiar a otros. No se quite el tapabocas para hablar, toser o estornudar. No utilice tapabocas en niños menores de 2 años ni en personas que tengan problemas respiratorios o no puedan quitárselo  •Permita que solo los profesionales médicos u otros profesionales ingresen a loja casa.Use el tapabocas y recuérdeles a los profesionales que usen un tapabocas. Recuérdeles que se laven las dutch cuando lleguen y antes de irse. No deje entrar a nadie que no viva en loja casa, aunque no esté enfermo. Jenna persona puede contagiar el virus a otros antes de que comiencen los síntomas de COVID-19. Algunas personas ni siquiera desarrollan síntomas. Los niños suelen tener síntomas leves o ningún síntoma. Puede ser difícil decirle a un darrian que no lo abrace ni lo bese. Explíquele que así es roly puede ayudarlo a mantenerse saludable.  •No vaya a la casa de otra persona, a menos que sea necesario.No vaya de visita, aunque la persona esté naun. Vaya solo si necesita ayudarla. Asegúrese de que ambos usen un tapabocas mientras esté allí.  •Evite las grandes reuniones y las multitudes.Las reuniones o multitudes de 10 o más individuos pueden hacer que el virus se propague. Por ejemplo, las reuniones incluyen fiestas, eventos deportivos, servicios religiosos y conferencias. Se pueden formar multitudes en las playas, los parques y las atracciones turísticas. Protéjase manteniéndose alejado de las grandes reuniones y multitudes.  •Pregunte a loja médico de qué otra forma puede tener las citas.Es posible que pueda tener citas sin tener que ir al consultorio del médico. Algunos médicos ofrecen citas por teléfono, video u otros tipos de citas. También puede obtener recetas de becki medicamentos para varios meses de jenna vez.  •Manténgase a jean si debe que salir a trabajar.Es posible que tenga un trabajo que solo se puede hacer fuera de loja casa. Mantenga la distancia física entre usted y los demás trabajadores tanto roly sea posible. Siga las reglas de loja empleador para que todos estén a jean.  ¿Qué dee hacer si tengo COVID-19 y me estoy recuperando en casa?Los médicos le darán instrucciones específicas que debe seguir. Las siguientes son pautas generales para recordarle cómo mantener a los demás a jean hasta que usted esté alexander:   •Lávese las dutch frecuentemente.Use agua y jabón tanto roly sea posible. Puede usar un desinfectante para dutch que contenga alcohol, si no hay agua y jabón disponibles. No comparta toallas con nadie. Si usa toallas de papel, deséchelas en un cubo de basura recubierto que se guarda en loja habitación o área. Use un cubo de basura cubierto, si es posible.  •No salga de loja casa a menos que sea necesario.Es posible que tenga que ir al consultorio de loja médico para hacerse chequeos o para resurtir jenna receta. No llegue al consultorio del médico sin jenna barbara. Los médicos tienen que hacer que becki consultorios mignon seguros para el personal y otros pacientes.  •No entre en contacto físico cercano con nadie, jean que sea necesario.Solo tenga un contacto físico cercano con jenna persona que lo cuide directamente, o con un bebé o darrian que deba cuidar. Los miembros de la yash y los amigos no deben visitarlo. Si es posible, quédese en un área o habitación separada de loja casa si vive con otras personas. Nadie debe entrar en el área o en la habitación excepto para brindarle cuidados. Puede visitar a los demás por teléfono, videochat, correo electrónico o sistemas similares. Es importante mantenerse conectado con los demás en loja kelsea mientras se recupera.  •Use un tapabocas mientras haya otras personas cerca de usted.[place] puede ayudar a evitar que las gotitas propaguen el virus cuando usted habla, estornuda o tose. Póngase el tapabocas antes de que la persona entre en loja habitación o área. Recuérdele a la persona que se cubra la nariz y la boca antes de entrar a brindarle cuidados.  •No comparta artículos.No comparta platos, toallas ni otros artículos con nadie. Los artículos deben ser lavados después de usarlos.  •Proteja a loja bebé.Lávese las dutch con agua y jabón con frecuencia fouzia todo el día. Use un tapabocas mientras amamanta o mientras se extrae o se saca la leche materna. Si es posible, pídale a alguien que esté alexander que cuide de loja bebé. Puede poner la leche materna en biberones para que la persona la use, si es necesario. Hable con loja médico si tiene preguntas o inquietudes acerca de cómo cuidar o vincularse con loja bebé. También le dirá cuándo debe traer a loja bebé para los chequeos y las vacunas. También le dirá qué hacer si nikita que loja bebé está infectado con el nuevo virus.      •No manipule animales vivos.Hasta que se sepa más, es mejor no tocar, jugar o manipular animales vivos. Algunos animales, incluyendo las mascotas, stevens sido infectados con el nuevo coronavirus. No manipule ni cuide animales hasta que esté alexander. El cuidado incluye alimentar, acariciar y abrazar a loja mascota. No deje que loja mascota lo lama o comparta loja comida. Pídale a alguien que no esté infectado que cuide de loja mascota, si es posible. Si debe cuidar a jenna mascota, usa un tapabocas. Lávese las dutch antes y después de cuidar a loja mascota.  •Siga las instrucciones de loja médico para estar cerca de los demás después de recuperarse.Deberá esperar al menos 10 días después de la aparición de los síntomas. Entonces deberá pasar 24 horas sin fiebre sin recibir medicamentos para la fiebre, y sin otros síntomas. La pérdida del sentido del gusto o el olfato puede continuar fouzia varios meses. Se considera que está alexander estar cerca de otros si thania es el único síntoma. No se sabe con certeza si jenna persona recuperada puede transmitir el virus a otros, ni por cuánto tiempo. Loja médico puede darle instrucciones, roly continuar con el distanciamiento social o usar un tapabocas cuando esté cerca de otras personas.  ¿Cómo dee cuidar a alguien que tiene COVID-19?Si la persona vive en otro hogar, coordine un tiempo para brindar cuidados. Recuerde llevar algunos pares de guantes desechables y un tapabocas. Las siguientes son las pautas generales para ayudarle a cuidar de forma burks a cualquier persona que tenga COVID-19:  •Lávese las dutch frecuentemente.Lávese antes y después de entrar en la casa, área o habitación de la persona. Deseche las toallas de papel en un cubo de basura recubierto que tenga jenna tapa, si es posible.  •No permita que otros se acerquen a la persona.Nadie debe ingresar a la casa de la persona a menos que sea necesario. De ser posible, la persona debe estar en un área o habitación separada si vive con otras personas. Mantenga la fidel de la habitación cerrada a menos que necesite entrar o salir. Rex que otras personas llamen, charlen por video o envíen un correo electrónico a la persona si se siente lo suficientemente alexander. La persona puede sentirse naun si se la mantiene separada fouzia un viry período de tiempo. La comunicación burks puede ayudar a esta persona a mantenerse en contacto con loja yash y amigos.  •Asegúrese de que la habitación de la persona tenga un buen flujo de aire.Puede abrir la ventana si el clima lo permite. También se puede encender el aire acondicionado para ayudar a que el aire se mueva.  •Comuníquese con la persona antes de entrar para brindarle cuidados.Asegúrese de que la persona use un tapabocas. Recuérdele que se lave las dutch con agua y jabón. Puede usar un desinfectante para dutch que contenga alcohol, si no hay agua y jabón disponibles. Colóquese el tapabocas antes de entrar al lugar a brindar cuidados.  •Use guantes mientras frank cuidados y limpia.Limpie los objetos que la persona usa a menudo. Limpie las encimeras, las superficies de cocción y los frentes y el interior del microondas y el refrigerador. Limpie la ducha, el sanitario, el área alrededor del sanitario, el lavabo, el área alrededor del lavabo y los grifos. Junte la ropa sucia o la ropa de cama. Lave y seque los artículos con el agua más caliente que permita la jignesh. Lave los platos y utensilios usados en [place] y jabonosa o en un lavavajillas.  •Todo lo que deseche debe ir a un cubo de basura recubierto.Cuando necesite vaciar la basura, cierre el extremo abierto de la cubierta y átela. [place] ayuda a evitar que los artículos en los que está el virus se salgan de la basura. Quítese los guantes y deséchelos. Lávese las dutch.      ¿Dónde puedo obtener más información?  •Cent[facility] for Disease Control and Prevention  [address] [place] [address]  Phone: [phone]  Web Address: http://www.cdc.gov      ¿Qué dee hacer si pienso que yo o alguien que conozco está infectado?Rex lo siguiente para proteger a otras personas:   •Si se requiere atención de emergencia,avise al operador de la posible infección, o llame antes y avise al servicio de urgencias.  •Llame a un médicopara recibir instrucciones si los síntomas son leves. Cualquier persona que pueda estar infectada no debe llegar sin llamar agueda. El médico deberá proteger a los miembros del personal y a otros pacientes.  •La persona que puede estar infectada debe usar un tapabocasmientras reciben atención médica. [place] ayudará a reducir el riesgo de infectar a otras personas. Nadie que sea marco antonio de 2 años, que tenga problemas respiratorios o que no pueda quitárselo debe usar un tapabocas. El médico puede darle instrucciones para cualquier persona que no pueda usar un tapabocas.      Llame al número local de emergencias (911 en los [place]) o al departamento de emergencias si:  •Usted tiene dificultad para respirar o falta de aliento mientras descansa.  •Usted siente presión o dolor en el pecho que dura más de 5 minutos.  •Usted tiene confusión o es difícil despertarlo.  •Becki labios o dino están azules.  •Usted tiene fiebre de 104 ºF (40 °C) o más.  ¿Cuándo dee llamar a mi médico?  •No tiene síntomas de COVID-19 leydi tuvo contacto físico cercano dentro de los 14 días con alguien que marissa positivo.  •Usted tiene preguntas o inquietudes acerca de loja condición o cuidado.      ACUERDOS SOBRE LOJA CUIDADO:  Usted tiene el derecho de ayudar a planear loja cuidado. Aprenda todo lo que pueda sobre loja condición y roly darle tratamiento. Discuta becki opciones de tratamiento con becki médicos para decidir el cuidado que usted desea recibir. Usted siempre tiene el derecho de rechazar el tratamiento.

## 2022-01-07 NOTE — ED PROVIDER NOTE - CLINICAL SUMMARY MEDICAL DECISION MAKING FREE TEXT BOX
22 y/o male c/o chest pain and sob s/p recent covid19 infection  -likely rleated to viral covid infection, low suspicion for ACS  -ekg cxr  -reassess

## 2022-01-07 NOTE — ED PROVIDER NOTE - OBJECTIVE STATEMENT
20 y/o male no PMH presents to ER c/o chest pain. Pt. states that he was recently diagosed with covid19 - had uri symptoms - sore throat fevers chills bodyaches - states most of these symptoms have been improving but this evening at 1130 pm developed chest pain and back pain along with mild sob. states his lungs feel like "when I go outside in the cold and then come back inside again". Denies palpitations dizziness weakness loc syncope.

## 2022-01-07 NOTE — ED ADULT NURSE NOTE - OBJECTIVE STATEMENT
Patient A&Ox4 ambulatory c/o chest discomfort x1 day. Patient states he was diagnosis with COVID19 few days ago. Endorsing body aches. Respirations even and unlabored. Patient received XR and discharged home. Vitals stable. Father outside for . Steady gait observed to exit.

## 2022-01-07 NOTE — ED PROVIDER NOTE - PATIENT PORTAL LINK FT
You can access the FollowMyHealth Patient Portal offered by [facility] by registering at the following website: [email or web address]. By joining [facility]’s FollowMyHealth portal, you will also be able to view your health information using other applications (apps) compatible with our system.

## 2022-02-14 NOTE — BEHAVIORAL HEALTH ASSESSMENT NOTE - HPI (INCLUDE ILLNESS QUALITY, SEVERITY, DURATION, TIMING, CONTEXT, MODIFYING FACTORS, ASSOCIATED SIGNS AND SYMPTOMS)
Day #3 of Vancomycin  Indication:  Bloodstream infection  Current regimen:  750 mg IV Q 12 hr  Abx regimen:  Cefepime + Vancomycin (azith dced )  ID Following ?: YES  Concomitant nephrotoxic drugs (requires more frequent monitoring): None  Frequency of BMP?: Daily until specified    Recent Labs     22  0340 22  0524 22  0401   WBC 8.2 12.8* 30.8*   CREA 0.45* 0.28* 0.51*   BUN 12 9 10     Est CrCl: ~50-60 ml/min; UO: 0.3 ml/kg/hr (12 hr measurement)  Temp (24hrs), Av °F (36.7 °C), Min:97.8 °F (36.6 °C), Max:98.4 °F (36.9 °C)    Cultures:    Blood: Strep pneumo in 1/4 bottles, prelim   Blood: NGTD, prelim    MRSA Swab ordered (if applicable)? N/A    Goal target range AUC/MARLENE 400-600    Recent level history:  Date/Time Dose & Interval Measured Level (mcg/mL) Associated AUC/MARLENE Dose Adjustment    22 750 mg q12h 12.1 mg/L 427 mg/L.hr --                                         Plan: Level within target range. WBC WNL, afebrile, repeat cultures NGTD. Continue current regimen. Azith dced by ID yesterday, continue cefepime and vanc pending final blood cultures; no growth so far. Patient is a 16-9 year old,  male; domicile with parents and sibglings; starting 11th grade in [employer] (in advanced classes, high 90s student) ; no PPH; no psych hospitalizations; no known suicide attempts; no known history of violence or arrests; no active substance abuse or known history of complicated withdrawal; no significant past medical history; brought in by his parents for hallucinations. Patient was admitted medically for work up; work up including CBC, CMP, UA, TSH, Urine/Serum Tox, Head CT, LP, Head MRI, and VEEG were all within normal limits. Psychiatry was consulted for hallucinations and altered mental status.    Patient reports that he started having a fever and a headache (around his whole head) on 8/29 at which time he took Nyquil at the recommended dose on the bottle. He reports on 8/30, he continued to have the symptoms so he took another kind of Nyquil because he ran out from the first bottle.  He reports that on 8/31, he took Tylenol cold and fever type. He reports that he had never taken Tylenol before. He reports that on 9/1, patient was driving with his family to [place] on a 7hour drive. He reports his headache was worsening.  He reports he took Tylenol cold and congestion type. He reports that he started to feel confused and not like himself. He reports that he had a "fuzzy" feeling. He reports that he was forgetting things like making tea, and when his family told him to drink the tea he made, he thought they were playing tricks on him because he could not remember making the tea. He reports it is very unlike him to be forgetful. He reports that on 9/2, he had nausea as well. He reports that symptoms felt better when he was moving, but if he stopped, it would feel worse. He reports that he saw a dark shadow for about a minute.  He reports that on 9/3, while driving back from [place], he saw godzilla in the lara for about 10 seconds.  He reports he was very frightened and started crying. He reports he does believe this was when he was waking up from sleep.  He reports that he knew godzilla was not really there, but the "what if" was frightening. He reports that a few hours after they got home, patient continued to feel badly so they came to the ER.    Patient reports to feeling better now. He reports that sleeping has been helping. He reports that he was sleeping about 3 hours the past week because he kept waking up.  He reports he is feeling more like himself, but still has a slight "fuzzy" feeling. He reports he does not feel confused anymore. He reports worrying about if this is permanent, but reports he has been handling it well. Patient and parents deny episodes of aggression during this past week.  Parents corroborate patient's report of what occurred. Patient reports he thinks he took the recommended dose of Tylenol, but is not 100% sure if he could have taken more.  He denies any use of alcohol or drugs.       Prior this past week, Patient denies sx of depression including depressed mood, changes in sleep, anhedonia, increased feelings of guilt/worthlessness, poor energy, poor concentration, changes in appetite, psychomotor changes, and suicidal thoughts.  Patient denies SI/HI with no intetn or plan. Patient denies AH or thoughts of paranoia.  Patient endorses feeling anxious about school work (assignments) and soccer, but denies that anxiety has impaired his functioning in anyway and denies panic attacks. Patient denies hx of clear lizeth.  Patient denies sx of PTSD.

## 2022-06-29 NOTE — ED PEDIATRIC NURSE REASSESSMENT NOTE - NEURO WDL
Alert and oriented to person, place and time, memory intact, behavior appropriate to situation, PERRL.
COVID-19
complains of pain/discomfort

## 2022-09-07 ENCOUNTER — APPOINTMENT (OUTPATIENT)
Dept: INTERNAL MEDICINE | Facility: CLINIC | Age: 22
End: 2022-09-07

## 2022-09-07 ENCOUNTER — OUTPATIENT (OUTPATIENT)
Dept: OUTPATIENT SERVICES | Facility: HOSPITAL | Age: 22
LOS: 1 days | End: 2022-09-07
Payer: SELF-PAY

## 2022-09-07 ENCOUNTER — MED ADMIN CHARGE (OUTPATIENT)
Age: 22
End: 2022-09-07

## 2022-09-07 VITALS
HEIGHT: 70 IN | BODY MASS INDEX: 24.34 KG/M2 | HEART RATE: 63 BPM | OXYGEN SATURATION: 98 % | SYSTOLIC BLOOD PRESSURE: 118 MMHG | WEIGHT: 170 LBS | DIASTOLIC BLOOD PRESSURE: 82 MMHG

## 2022-09-07 DIAGNOSIS — Z87.2 PERSONAL HISTORY OF DISEASES OF THE SKIN AND SUBCUTANEOUS TISSUE: ICD-10-CM

## 2022-09-07 DIAGNOSIS — R21 RASH AND OTHER NONSPECIFIC SKIN ERUPTION: ICD-10-CM

## 2022-09-07 DIAGNOSIS — F51.02 ADJUSTMENT INSOMNIA: ICD-10-CM

## 2022-09-07 DIAGNOSIS — Z86.19 PERSONAL HISTORY OF OTHER INFECTIOUS AND PARASITIC DISEASES: ICD-10-CM

## 2022-09-07 DIAGNOSIS — I10 ESSENTIAL (PRIMARY) HYPERTENSION: ICD-10-CM

## 2022-09-07 DIAGNOSIS — L30.1 DYSHIDROSIS [POMPHOLYX]: ICD-10-CM

## 2022-09-07 PROCEDURE — 80053 COMPREHEN METABOLIC PANEL: CPT

## 2022-09-07 PROCEDURE — 87389 HIV-1 AG W/HIV-1&-2 AB AG IA: CPT

## 2022-09-07 PROCEDURE — G0444 DEPRESSION SCREEN ANNUAL: CPT | Mod: 59

## 2022-09-07 PROCEDURE — 99213 OFFICE O/P EST LOW 20 MIN: CPT | Mod: 25

## 2022-09-07 PROCEDURE — G0008: CPT

## 2022-09-07 PROCEDURE — 85025 COMPLETE CBC W/AUTO DIFF WBC: CPT

## 2022-09-07 PROCEDURE — 86780 TREPONEMA PALLIDUM: CPT

## 2022-09-07 PROCEDURE — 86803 HEPATITIS C AB TEST: CPT

## 2022-09-07 PROCEDURE — 90686 IIV4 VACC NO PRSV 0.5 ML IM: CPT

## 2022-09-07 RX ORDER — CLOBETASOL PROPIONATE 0.5 MG/G
0.05 CREAM TOPICAL
Qty: 1 | Refills: 1 | Status: DISCONTINUED | COMMUNITY
Start: 2021-08-26 | End: 2022-09-07

## 2022-09-07 RX ORDER — HYDROCORTISONE 25 MG/G
2.5 CREAM TOPICAL
Qty: 2 | Refills: 1 | Status: DISCONTINUED | COMMUNITY
Start: 2021-08-26 | End: 2022-09-07

## 2022-09-09 LAB
ALBUMIN SERPL ELPH-MCNC: 5.3 G/DL
ALP BLD-CCNC: 88 U/L
ALT SERPL-CCNC: 21 U/L
ANION GAP SERPL CALC-SCNC: 12 MMOL/L
AST SERPL-CCNC: 24 U/L
BASOPHILS # BLD AUTO: 0.03 K/UL
BASOPHILS NFR BLD AUTO: 0.5 %
BILIRUB SERPL-MCNC: 0.3 MG/DL
BUN SERPL-MCNC: 8 MG/DL
C TRACH RRNA SPEC QL NAA+PROBE: NOT DETECTED
CALCIUM SERPL-MCNC: 10 MG/DL
CHLORIDE SERPL-SCNC: 101 MMOL/L
CO2 SERPL-SCNC: 28 MMOL/L
CREAT SERPL-MCNC: 0.8 MG/DL
EGFR: 129 ML/MIN/1.73M2
EOSINOPHIL # BLD AUTO: 0.23 K/UL
EOSINOPHIL NFR BLD AUTO: 3.5 %
GLUCOSE SERPL-MCNC: 89 MG/DL
HCT VFR BLD CALC: 48.8 %
HCV AB SER QL: NONREACTIVE
HCV S/CO RATIO: 0.1 S/CO
HGB BLD-MCNC: 16.6 G/DL
HIV1+2 AB SPEC QL IA.RAPID: NONREACTIVE
IMM GRANULOCYTES NFR BLD AUTO: 0.5 %
LYMPHOCYTES # BLD AUTO: 2.29 K/UL
LYMPHOCYTES NFR BLD AUTO: 34.5 %
MAN DIFF?: NORMAL
MCHC RBC-ENTMCNC: 32.2 PG
MCHC RBC-ENTMCNC: 34 GM/DL
MCV RBC AUTO: 94.8 FL
MONOCYTES # BLD AUTO: 0.6 K/UL
MONOCYTES NFR BLD AUTO: 9 %
N GONORRHOEA RRNA SPEC QL NAA+PROBE: NOT DETECTED
NEUTROPHILS # BLD AUTO: 3.46 K/UL
NEUTROPHILS NFR BLD AUTO: 52 %
PLATELET # BLD AUTO: 265 K/UL
POTASSIUM SERPL-SCNC: 4.1 MMOL/L
PROT SERPL-MCNC: 7.8 G/DL
RBC # BLD: 5.15 M/UL
RBC # FLD: 11.8 %
SODIUM SERPL-SCNC: 141 MMOL/L
SOURCE AMPLIFICATION: NORMAL
T PALLIDUM AB SER QL IA: NEGATIVE
WBC # FLD AUTO: 6.64 K/UL

## 2022-09-16 NOTE — HEALTH RISK ASSESSMENT
[Excellent] : ~his/her~ current health as excellent [Very Good] : ~his/her~  mood as very good [Never] : Never [Monthly or less (1 pt)] : Monthly or less (1 point) [3 or 4 (1 pt)] : 3 or 4  (1 point) [Less than monthly (1 pt)] : Less than monthly (1 point) [Yes] : In the past 12 months have you used drugs other than those required for medical reasons? Yes [No falls in past year] : Patient reported no falls in the past year [1] : 2) Feeling down, depressed, or hopeless for several days (1) [PHQ-2 Positive] : PHQ-2 Positive [Several Days (1)] : 4.) Feeling tired or having little energy? Several days [1/2 of Days or More (2)] : 7.) Trouble concentrating on things, such as reading a newspaper or watching television? Half the days or more [Not at All (0)] : 8.) Moving or speaking so slowly that other people could have noticed, or the opposite, moving or speaking faster than usual? Not at all [Mild] : severity of depression is mild [Not at all] : How difficult have these problems made it for you to do your work, take care of things at home, or get along with people? Not at all [PHQ-9 Positive] : PHQ-9 Positive [I have developed a follow-up plan documented below in the note.] : I have developed a follow-up plan documented below in the note. [HIV Test offered] : HIV Test offered [Hepatitis C test offered] : Hepatitis C test offered [With Family] : lives with family [Employed] : employed [Student] : student [College] : College [Single] : single [Sexually Active] : sexually active [Feels Safe at Home] : Feels safe at home [Fully functional (bathing, dressing, toileting, transferring, walking, feeding)] : Fully functional (bathing, dressing, toileting, transferring, walking, feeding) [Fully functional (using the telephone, shopping, preparing meals, housekeeping, doing laundry, using] : Fully functional and needs no help or supervision to perform IADLs (using the telephone, shopping, preparing meals, housekeeping, doing laundry, using transportation, managing medications and managing finances) [Reports normal functional visual acuity (ie: able to read med bottle)] : Reports normal functional visual acuity [Smoke Detector] : smoke detector [Carbon Monoxide Detector] : carbon monoxide detector [Seat Belt] :  uses seat belt [Sunscreen] : uses sunscreen [Audit-CScore] : 3 [de-identified] : Marijuana, infrequently. [de-identified] : Running, Gym [de-identified] : "clean" fast food (salad, chipotle, etc.), home cooked foods [[id number]] : 2 [GYS4FabpnWdgwa] : 8 [Change in mental status noted] : No change in mental status noted [High Risk Behavior] : no high risk behavior [Reports changes in hearing] : Reports no changes in hearing [Reports changes in vision] : Reports no changes in vision [Reports changes in dental health] : Reports no changes in dental health [Guns at Home] : no guns at home [Travel to Developing Areas] : does not  travel to developing areas [TB Exposure] : is not being exposed to tuberculosis [Caregiver Concerns] : does not have caregiver concerns [HIVDate] : 09/22 [HepatitisCDate] : 09/22 [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

## 2022-09-16 NOTE — PLAN
[FreeTextEntry1] : 21M w/ no PMH presenting to clinic for annual physical c/o mild GERD, positive PHQ-9. \par \par #HCM\par - f/u CBC/CMP\par - f/u STI screening\par \par RTC 1 year or PRN

## 2022-09-16 NOTE — HISTORY OF PRESENT ILLNESS
[FreeTextEntry1] : Annual Physical [de-identified] : 21M with no PMH presenting to clinic for annual physical. Pt has no current complaints, but notes that he wants to be attentive to his health. He recently had Covid-19 1/2022 for which he took no medications but had presented to the ED for some chest tightness on inspiration, workup negative and was discharged without intervention. Patient currently lives at home with parents, in school studying to become a , and works part-time in a machine shop. He states he frequently goes on runs to meditate and clear his head. He tries to eat healthy, noting when he eats outside, he usually tries to find some  options such as chipotle. Of note, he has mild heartburn especially if he lays down after eating a large meal. He states that he is under a "manageable" amount of stress, largely coming from imposter syndrome in school and pressures of being a first-generation child with expectations from his parents. He often feels better after going for a jog, denies any SI/HI or thoughts of self-harm.  \par \par Patient denies any recent fever, chills, n/v/d, sick contacts, cough, chest pain, palpitations, SOB, RAMIREZ, lightheadedness, abdominal discomfort, urinary/bowel changes. He denies any recent travel, sick contacts. He has annual vision exams, and is currently in process of getting a dentist.

## 2022-09-21 ENCOUNTER — TRANSCRIPTION ENCOUNTER (OUTPATIENT)
Age: 22
End: 2022-09-21

## 2022-10-09 ENCOUNTER — EMERGENCY (EMERGENCY)
Facility: HOSPITAL | Age: 22
LOS: 1 days | Discharge: HOME | End: 2022-10-09
Attending: STUDENT IN AN ORGANIZED HEALTH CARE EDUCATION/TRAINING PROGRAM | Admitting: STUDENT IN AN ORGANIZED HEALTH CARE EDUCATION/TRAINING PROGRAM

## 2022-10-09 VITALS
RESPIRATION RATE: 18 BRPM | OXYGEN SATURATION: 100 % | HEART RATE: 99 BPM | DIASTOLIC BLOOD PRESSURE: 86 MMHG | SYSTOLIC BLOOD PRESSURE: 148 MMHG | TEMPERATURE: 99 F

## 2022-10-09 PROCEDURE — 99283 EMERGENCY DEPT VISIT LOW MDM: CPT

## 2022-10-09 NOTE — ED PROVIDER NOTE - PATIENT PORTAL LINK FT
You can access the FollowMyHealth Patient Portal offered by [facility] by registering at the following website: [email or web address]. By joining [employer]’s FollowMyHealth portal, you will also be able to view your health information using other applications (apps) compatible with our system.

## 2022-10-09 NOTE — ED PROVIDER NOTE - NSFOLLOWUPINSTRUCTIONS_ED_ALL_ED_FT
Follow up with your primary care doctor  Complete full course of augmentin as prescribed  Use nasal saline every 2-3hrs as needed for symptom relief  Use anti-inflammatory medications like advil/motrin/aleve as directed as needed for pain

## 2022-10-09 NOTE — ED PROVIDER NOTE - OBJECTIVE STATEMENT
21M no pMH p/w 5 days URI symptoms. Symptoms began as runny nose, now with post-nasal gtt and cough. No fever/chills. No abd pain, vomiting or diarrhea. Had nausea a/w GERD but now resolved. Denies cough, cp, sob, difficulty breathing.

## 2022-10-09 NOTE — ED PROVIDER NOTE - CLINICAL SUMMARY MEDICAL DECISION MAKING FREE TEXT BOX
21M p/w sinus congestion and post-nasal gtt likely in setting of recent viral infection - will send abx for possible superimposed bacterial process.

## 2022-10-09 NOTE — ED ADULT TRIAGE NOTE - CHIEF COMPLAINT QUOTE
cold/nasal congestion    c/o cough, cold symptoms, green mucus from nose for 5 days.  did not test for covid

## 2022-10-09 NOTE — ED PROVIDER NOTE - CROS ED ROS STATEMENT
Goal Outcome Evaluation:  Plan of Care Reviewed With: patient  Progress: improving  Outcome Summary: Pt. transferred to PCU from [facility] last night after fast call on 1/3/21, confusion throughout night, pt. couhged when drinking fluids yesterday so SLP consult put in and pt. kept NPO, Pt. does yell out occasionally and does cuss at staff but has not become combative, remains on 4lpm NC overnight which was okay with on call MD, will continue to monitor   all other ROS negative except as per HPI

## 2022-10-10 ENCOUNTER — NON-APPOINTMENT (OUTPATIENT)
Age: 22
End: 2022-10-10

## 2024-01-05 ENCOUNTER — OUTPATIENT (OUTPATIENT)
Dept: OUTPATIENT SERVICES | Facility: HOSPITAL | Age: 24
LOS: 1 days | End: 2024-01-05
Payer: SELF-PAY

## 2024-01-05 ENCOUNTER — APPOINTMENT (OUTPATIENT)
Dept: INTERNAL MEDICINE | Facility: CLINIC | Age: 24
End: 2024-01-05
Payer: COMMERCIAL

## 2024-01-05 ENCOUNTER — NON-APPOINTMENT (OUTPATIENT)
Age: 24
End: 2024-01-05

## 2024-01-05 VITALS
DIASTOLIC BLOOD PRESSURE: 70 MMHG | WEIGHT: 195 LBS | HEART RATE: 95 BPM | OXYGEN SATURATION: 97 % | BODY MASS INDEX: 27.92 KG/M2 | SYSTOLIC BLOOD PRESSURE: 120 MMHG | HEIGHT: 70 IN

## 2024-01-05 DIAGNOSIS — K21.9 GASTRO-ESOPHAGEAL REFLUX DISEASE W/OUT ESOPHAGITIS: ICD-10-CM

## 2024-01-05 DIAGNOSIS — F43.9 REACTION TO SEVERE STRESS, UNSPECIFIED: ICD-10-CM

## 2024-01-05 DIAGNOSIS — Z23 ENCOUNTER FOR IMMUNIZATION: ICD-10-CM

## 2024-01-05 DIAGNOSIS — Z00.00 ENCOUNTER FOR GENERAL ADULT MEDICAL EXAMINATION W/OUT ABNORMAL FINDINGS: ICD-10-CM

## 2024-01-05 DIAGNOSIS — I10 ESSENTIAL (PRIMARY) HYPERTENSION: ICD-10-CM

## 2024-01-05 DIAGNOSIS — R07.89 OTHER CHEST PAIN: ICD-10-CM

## 2024-01-05 LAB
BASOPHILS # BLD AUTO: 0.03 K/UL
BASOPHILS NFR BLD AUTO: 0.5 %
EOSINOPHIL # BLD AUTO: 0.29 K/UL
EOSINOPHIL NFR BLD AUTO: 4.8 %
HCT VFR BLD CALC: 45 %
HGB BLD-MCNC: 15.5 G/DL
HIV1+2 AB SPEC QL IA.RAPID: NONREACTIVE
IMM GRANULOCYTES NFR BLD AUTO: 0.7 %
LYMPHOCYTES # BLD AUTO: 1.77 K/UL
LYMPHOCYTES NFR BLD AUTO: 29.6 %
MAN DIFF?: NORMAL
MCHC RBC-ENTMCNC: 32 PG
MCHC RBC-ENTMCNC: 34.4 GM/DL
MCV RBC AUTO: 92.8 FL
MONOCYTES # BLD AUTO: 0.53 K/UL
MONOCYTES NFR BLD AUTO: 8.9 %
NEUTROPHILS # BLD AUTO: 3.32 K/UL
NEUTROPHILS NFR BLD AUTO: 55.5 %
PLATELET # BLD AUTO: 263 K/UL
RBC # BLD: 4.85 M/UL
RBC # FLD: 12 %
WBC # FLD AUTO: 5.98 K/UL

## 2024-01-05 PROCEDURE — 80053 COMPREHEN METABOLIC PANEL: CPT

## 2024-01-05 PROCEDURE — 99214 OFFICE O/P EST MOD 30 MIN: CPT | Mod: 25

## 2024-01-05 PROCEDURE — 86780 TREPONEMA PALLIDUM: CPT

## 2024-01-05 PROCEDURE — 87389 HIV-1 AG W/HIV-1&-2 AB AG IA: CPT

## 2024-01-05 PROCEDURE — 85025 COMPLETE CBC W/AUTO DIFF WBC: CPT

## 2024-01-05 RX ORDER — FAMOTIDINE 10 MG/1
10 TABLET, FILM COATED ORAL
Qty: 60 | Refills: 1 | Status: ACTIVE | COMMUNITY
Start: 2024-01-05 | End: 1900-01-01

## 2024-01-05 NOTE — HISTORY OF PRESENT ILLNESS
[de-identified] : 23M PMH GERD presenting for annual  #chest discomfort -intermittent, notices at rest, feels like discomfort, goes away when doing exercise -noticed half a year ago -lasts about a week long -also feels SOB, comes randomly, also at rest, not with activity -no cough, fevers, leg swelling -has gerd, takes tums intermitently  meds-zyrtec prn and vitamin c allergies: seasonal and dander diet: breakfast-cereal and toast, lunch-chicken pasta, dinner-pasta/fast food exercise: used to exercise more, now sometimes cardio sleep: 6-7 hours, at school SH: goes to college, mechanical engineering sexual health: one parter, never smoker, drinks 2 drinks occasionally

## 2024-01-05 NOTE — ASSESSMENT
[FreeTextEntry1] : 23M PMH GERD presenting for annual  #Chest discomfort #GERD -less likly cardiac related given it occurs at rest and improves with exercise -EKG normal -possibly related to GERD, trial of famotidine -counseled patient to stop taking vitamin c at night  #HCM -flu shot given this visit -tdap 2022 -f/u cbc, cmp, c/g, hiv, rpr -phq9 score 5, improved from prior  RTC 5-10 weeks Case discussed w Dr. Pappas

## 2024-01-05 NOTE — HEALTH RISK ASSESSMENT
[Yes] : Yes [Monthly or less (1 pt)] : Monthly or less (1 point) [1 or 2 (0 pts)] : 1 or 2 (0 points) [Never (0 pts)] : Never (0 points) [1] : 2) Feeling down, depressed, or hopeless for several days (1) [PHQ-9 Positive] : PHQ-9 Positive [Never] : Never [I have developed a follow-up plan documented below in the note.] : I have developed a follow-up plan documented below in the note. [Audit-CScore] : 1 [[id number]] : 2

## 2024-01-05 NOTE — PHYSICAL EXAM
[No Acute Distress] : no acute distress [Well-Appearing] : well-appearing [Normal Sclera/Conjunctiva] : normal sclera/conjunctiva [PERRL] : pupils equal round and reactive to light [EOMI] : extraocular movements intact [Normal Outer Ear/Nose] : the outer ears and nose were normal in appearance [Normal Oropharynx] : the oropharynx was normal [No JVD] : no jugular venous distention [Supple] : supple [No Respiratory Distress] : no respiratory distress  [No Accessory Muscle Use] : no accessory muscle use [Clear to Auscultation] : lungs were clear to auscultation bilaterally [Regular Rhythm] : with a regular rhythm [Normal S1, S2] : normal S1 and S2 [No Murmur] : no murmur heard [No Edema] : there was no peripheral edema [Soft] : abdomen soft [Non Tender] : non-tender [Non-distended] : non-distended [Normal Posterior Cervical Nodes] : no posterior cervical lymphadenopathy [Normal Anterior Cervical Nodes] : no anterior cervical lymphadenopathy [No CVA Tenderness] : no CVA  tenderness [No Spinal Tenderness] : no spinal tenderness [No Joint Swelling] : no joint swelling [Grossly Normal Strength/Tone] : grossly normal strength/tone [No Rash] : no rash [No Focal Deficits] : no focal deficits [Normal Gait] : normal gait [Normal Affect] : the affect was normal [Normal Insight/Judgement] : insight and judgment were intact [de-identified] : tachycardic [de-identified] : chest pain non reproducible, crowing rooster maneuver neg

## 2024-01-08 ENCOUNTER — TRANSCRIPTION ENCOUNTER (OUTPATIENT)
Age: 24
End: 2024-01-08

## 2024-01-08 LAB
ALBUMIN SERPL ELPH-MCNC: 4.7 G/DL
ALP BLD-CCNC: 105 U/L
ALT SERPL-CCNC: 72 U/L
ANION GAP SERPL CALC-SCNC: 12 MMOL/L
AST SERPL-CCNC: 65 U/L
BILIRUB SERPL-MCNC: 0.2 MG/DL
BUN SERPL-MCNC: 10 MG/DL
C TRACH RRNA SPEC QL NAA+PROBE: NOT DETECTED
CALCIUM SERPL-MCNC: 9.5 MG/DL
CHLORIDE SERPL-SCNC: 103 MMOL/L
CO2 SERPL-SCNC: 25 MMOL/L
CREAT SERPL-MCNC: 0.88 MG/DL
EGFR: 124 ML/MIN/1.73M2
GLUCOSE SERPL-MCNC: 96 MG/DL
N GONORRHOEA RRNA SPEC QL NAA+PROBE: NOT DETECTED
POTASSIUM SERPL-SCNC: 4 MMOL/L
PROT SERPL-MCNC: 7.2 G/DL
SODIUM SERPL-SCNC: 140 MMOL/L
SOURCE AMPLIFICATION: NORMAL
T PALLIDUM AB SER QL IA: NEGATIVE

## 2024-03-12 ENCOUNTER — APPOINTMENT (OUTPATIENT)
Age: 24
End: 2024-03-12
Payer: COMMERCIAL

## 2024-03-12 PROCEDURE — D0140: CPT

## 2024-04-02 ENCOUNTER — APPOINTMENT (OUTPATIENT)
Age: 24
End: 2024-04-02
Payer: COMMERCIAL

## 2024-04-02 PROCEDURE — D1110 PROPHYLAXIS - ADULT: CPT

## 2024-04-02 PROCEDURE — D0230: CPT

## 2024-04-02 PROCEDURE — D0150: CPT

## 2024-04-02 PROCEDURE — D0274: CPT

## 2024-04-02 PROCEDURE — D0220: CPT

## 2024-09-16 ENCOUNTER — APPOINTMENT (OUTPATIENT)
Dept: INTERNAL MEDICINE | Facility: CLINIC | Age: 24
End: 2024-09-16
Payer: COMMERCIAL

## 2024-09-16 ENCOUNTER — OUTPATIENT (OUTPATIENT)
Dept: OUTPATIENT SERVICES | Facility: HOSPITAL | Age: 24
LOS: 1 days | End: 2024-09-16
Payer: SELF-PAY

## 2024-09-16 VITALS
HEIGHT: 70 IN | SYSTOLIC BLOOD PRESSURE: 120 MMHG | DIASTOLIC BLOOD PRESSURE: 78 MMHG | OXYGEN SATURATION: 99 % | BODY MASS INDEX: 23.13 KG/M2 | HEART RATE: 62 BPM | WEIGHT: 161.6 LBS

## 2024-09-16 DIAGNOSIS — I10 ESSENTIAL (PRIMARY) HYPERTENSION: ICD-10-CM

## 2024-09-16 DIAGNOSIS — Z00.00 ENCOUNTER FOR GENERAL ADULT MEDICAL EXAMINATION W/OUT ABNORMAL FINDINGS: ICD-10-CM

## 2024-09-16 DIAGNOSIS — R74.01 ELEVATION OF LEVELS OF LIVER TRANSAMINASE LEVELS: ICD-10-CM

## 2024-09-16 PROCEDURE — 99213 OFFICE O/P EST LOW 20 MIN: CPT | Mod: GC

## 2024-09-16 PROCEDURE — 80053 COMPREHEN METABOLIC PANEL: CPT

## 2024-09-16 PROCEDURE — G0463: CPT

## 2024-09-16 PROCEDURE — 83036 HEMOGLOBIN GLYCOSYLATED A1C: CPT

## 2024-09-16 PROCEDURE — 80061 LIPID PANEL: CPT

## 2024-09-16 NOTE — PHYSICAL EXAM
[No Acute Distress] : no acute distress [Well Developed] : well developed [Normal Sclera/Conjunctiva] : normal sclera/conjunctiva [Normal Outer Ear/Nose] : the outer ears and nose were normal in appearance [No Respiratory Distress] : no respiratory distress  [Clear to Auscultation] : lungs were clear to auscultation bilaterally [Normal Rate] : normal rate  [Regular Rhythm] : with a regular rhythm [No Murmur] : no murmur heard [No Edema] : there was no peripheral edema [Soft] : abdomen soft [Non Tender] : non-tender [Non-distended] : non-distended [Grossly Normal Strength/Tone] : grossly normal strength/tone [No Rash] : no rash [Coordination Grossly Intact] : coordination grossly intact [Speech Grossly Normal] : speech grossly normal [Normal Affect] : the affect was normal [Alert and Oriented x3] : oriented to person, place, and time

## 2024-09-19 LAB
ALBUMIN SERPL ELPH-MCNC: 4.8 G/DL
ALP BLD-CCNC: 103 U/L
ALT SERPL-CCNC: 33 U/L
ANION GAP SERPL CALC-SCNC: 10 MMOL/L
AST SERPL-CCNC: 21 U/L
BILIRUB SERPL-MCNC: 0.4 MG/DL
BUN SERPL-MCNC: 8 MG/DL
CALCIUM SERPL-MCNC: 9.9 MG/DL
CHLORIDE SERPL-SCNC: 104 MMOL/L
CHOLEST SERPL-MCNC: 132 MG/DL
CO2 SERPL-SCNC: 26 MMOL/L
CREAT SERPL-MCNC: 0.92 MG/DL
EGFR: 120 ML/MIN/1.73M2
ESTIMATED AVERAGE GLUCOSE: 103 MG/DL
GLUCOSE SERPL-MCNC: 98 MG/DL
HBA1C MFR BLD HPLC: 5.2 %
HDLC SERPL-MCNC: 48 MG/DL
LDLC SERPL CALC-MCNC: 66 MG/DL
NONHDLC SERPL-MCNC: 85 MG/DL
POTASSIUM SERPL-SCNC: 4.3 MMOL/L
PROT SERPL-MCNC: 7.7 G/DL
SODIUM SERPL-SCNC: 140 MMOL/L
TRIGL SERPL-MCNC: 97 MG/DL

## 2024-09-19 NOTE — ED PROVIDER NOTE - NSICDXNOFAMILYHX_GEN_ALL_ED
Performing Laboratory: [id number] Billing Type: Third-Party Bill Lab Facility: [id number] Expected Date Of Service: 09/19/2024 Bill For Surgical Tray: no <-- Click to add NO pertinent Family History

## 2024-09-23 PROBLEM — R74.01 TRANSAMINITIS: Status: ACTIVE | Noted: 2024-09-23

## 2024-09-23 NOTE — ASSESSMENT
[FreeTextEntry1] : 22 y/o M w/ PMH GERD, elevated LFTs presenting for follow-up visit.   #Transaminitis -Mild elevation of LFTs at last visit in January -Not repeated when elevated -Patient low risk for liver disease (minimal alcohol use hx, negative Hep C, healthy diet/exercise habits) -Will repeat CMP  #HCM -Patient deferring flu vaccine for next visit -TDAP UTD -STD testing recently done -F/u CMP, A1C, lipid profile

## 2024-09-23 NOTE — HISTORY OF PRESENT ILLNESS
[FreeTextEntry1] : Follow-up [de-identified] : 24 y/o M w/ PMH GERD presenting for follow-up visit. Last seen here in January for CPE. At that time, LFTs were found to be slightly increased. Not repeated at that time. Today, presents for follow-up visit.  Today, patient is asymptomatic and notes that he is here to get his LFTs checked. He states that he takes topical minoxidil and topical finasteride and is worried these may have been responsible for his elevated LFTs. He started taking minoxidil/finasteride in May of last year and states it works well for his hair loss. He drinks alcohol only occasionally. Patient tries to eat healthy and rarely eats fast food or processed food. He works out multiple times per week. He has a strong family history of diabetes mellitus. He denies any symptoms including chest pain, shortness of breath, abdominal pain, nausea, vomiting, diarrhea, or constipation.

## 2024-09-23 NOTE — HISTORY OF PRESENT ILLNESS
[FreeTextEntry1] : Follow-up [de-identified] : 22 y/o M w/ PMH GERD presenting for follow-up visit. Last seen here in January for CPE. At that time, LFTs were found to be slightly increased. Not repeated at that time. Today, presents for follow-up visit.  Today, patient is asymptomatic and notes that he is here to get his LFTs checked. He states that he takes topical minoxidil and topical finasteride and is worried these may have been responsible for his elevated LFTs. He started taking minoxidil/finasteride in May of last year and states it works well for his hair loss. He drinks alcohol only occasionally. Patient tries to eat healthy and rarely eats fast food or processed food. He works out multiple times per week. He has a strong family history of diabetes mellitus. He denies any symptoms including chest pain, shortness of breath, abdominal pain, nausea, vomiting, diarrhea, or constipation.

## 2024-09-23 NOTE — HISTORY OF PRESENT ILLNESS
[FreeTextEntry1] : Follow-up [de-identified] : 22 y/o M w/ PMH GERD presenting for follow-up visit. Last seen here in January for CPE. At that time, LFTs were found to be slightly increased. Not repeated at that time. Today, presents for follow-up visit.  Today, patient is asymptomatic and notes that he is here to get his LFTs checked. He states that he takes topical minoxidil and topical finasteride and is worried these may have been responsible for his elevated LFTs. He started taking minoxidil/finasteride in May of last year and states it works well for his hair loss. He drinks alcohol only occasionally. Patient tries to eat healthy and rarely eats fast food or processed food. He works out multiple times per week. He has a strong family history of diabetes mellitus. He denies any symptoms including chest pain, shortness of breath, abdominal pain, nausea, vomiting, diarrhea, or constipation.

## 2024-09-23 NOTE — ASSESSMENT
[FreeTextEntry1] : 24 y/o M w/ PMH GERD, elevated LFTs presenting for follow-up visit.   #Transaminitis -Mild elevation of LFTs at last visit in January -Not repeated when elevated -Patient low risk for liver disease (minimal alcohol use hx, negative Hep C, healthy diet/exercise habits) -Will repeat CMP  #HCM -Patient deferring flu vaccine for next visit -TDAP UTD -STD testing recently done -F/u CMP, A1C, lipid profile

## 2024-10-01 DIAGNOSIS — R74.01 ELEVATION OF LEVELS OF LIVER TRANSAMINASE LEVELS: ICD-10-CM

## 2024-10-14 ENCOUNTER — APPOINTMENT (OUTPATIENT)
Age: 24
End: 2024-10-14

## 2025-06-13 ENCOUNTER — OUTPATIENT (OUTPATIENT)
Dept: OUTPATIENT SERVICES | Facility: HOSPITAL | Age: 25
LOS: 1 days | End: 2025-06-13
Payer: SELF-PAY

## 2025-06-13 ENCOUNTER — APPOINTMENT (OUTPATIENT)
Dept: INTERNAL MEDICINE | Facility: CLINIC | Age: 25
End: 2025-06-13
Payer: COMMERCIAL

## 2025-06-13 VITALS
BODY MASS INDEX: 27.49 KG/M2 | WEIGHT: 192 LBS | OXYGEN SATURATION: 96 % | HEIGHT: 70 IN | DIASTOLIC BLOOD PRESSURE: 80 MMHG | SYSTOLIC BLOOD PRESSURE: 118 MMHG | HEART RATE: 93 BPM

## 2025-06-13 DIAGNOSIS — I10 ESSENTIAL (PRIMARY) HYPERTENSION: ICD-10-CM

## 2025-06-13 PROCEDURE — 80061 LIPID PANEL: CPT

## 2025-06-13 PROCEDURE — 83036 HEMOGLOBIN GLYCOSYLATED A1C: CPT

## 2025-06-13 PROCEDURE — 80053 COMPREHEN METABOLIC PANEL: CPT

## 2025-06-13 PROCEDURE — 99213 OFFICE O/P EST LOW 20 MIN: CPT | Mod: GE,25

## 2025-06-13 PROCEDURE — 85027 COMPLETE CBC AUTOMATED: CPT

## 2025-06-13 PROCEDURE — G0463: CPT

## 2025-06-18 ENCOUNTER — NON-APPOINTMENT (OUTPATIENT)
Age: 25
End: 2025-06-18

## 2025-06-18 LAB
ALBUMIN SERPL ELPH-MCNC: 4.8 G/DL
ALP BLD-CCNC: 112 U/L
ALT SERPL-CCNC: 46 U/L
ANION GAP SERPL CALC-SCNC: 14 MMOL/L
AST SERPL-CCNC: 29 U/L
BILIRUB SERPL-MCNC: 0.5 MG/DL
BUN SERPL-MCNC: 9 MG/DL
CALCIUM SERPL-MCNC: 9.3 MG/DL
CHLORIDE SERPL-SCNC: 104 MMOL/L
CHOLEST SERPL-MCNC: 117 MG/DL
CO2 SERPL-SCNC: 24 MMOL/L
CREAT SERPL-MCNC: 0.9 MG/DL
EGFRCR SERPLBLD CKD-EPI 2021: 122 ML/MIN/1.73M2
ESTIMATED AVERAGE GLUCOSE: 100 MG/DL
GLUCOSE SERPL-MCNC: 90 MG/DL
HBA1C MFR BLD HPLC: 5.1 %
HCT VFR BLD CALC: 45.6 %
HDLC SERPL-MCNC: 43 MG/DL
HGB BLD-MCNC: 15.8 G/DL
LDLC SERPL-MCNC: 63 MG/DL
MCHC RBC-ENTMCNC: 31.9 PG
MCHC RBC-ENTMCNC: 34.6 G/DL
MCV RBC AUTO: 92.1 FL
NONHDLC SERPL-MCNC: 74 MG/DL
PLATELET # BLD AUTO: 279 K/UL
POTASSIUM SERPL-SCNC: 4.1 MMOL/L
PROT SERPL-MCNC: 7.5 G/DL
RBC # BLD: 4.95 M/UL
RBC # FLD: 12.5 %
SODIUM SERPL-SCNC: 142 MMOL/L
TRIGL SERPL-MCNC: 49 MG/DL
WBC # FLD AUTO: 6.17 K/UL

## 2025-06-26 DIAGNOSIS — Z00.00 ENCOUNTER FOR GENERAL ADULT MEDICAL EXAMINATION WITHOUT ABNORMAL FINDINGS: ICD-10-CM
